# Patient Record
Sex: FEMALE | Race: OTHER | URBAN - METROPOLITAN AREA
[De-identification: names, ages, dates, MRNs, and addresses within clinical notes are randomized per-mention and may not be internally consistent; named-entity substitution may affect disease eponyms.]

---

## 2018-06-08 ENCOUNTER — INPATIENT (INPATIENT)
Facility: HOSPITAL | Age: 78
LOS: 3 days | Discharge: NURSING, REHAB OR LONG-TERM CARE | End: 2018-06-12
Attending: ORTHOPAEDIC SURGERY | Admitting: ORTHOPAEDIC SURGERY

## 2018-06-08 ENCOUNTER — TRANSCRIPTION ENCOUNTER (OUTPATIENT)
Age: 78
End: 2018-06-08

## 2018-06-08 VITALS
RESPIRATION RATE: 18 BRPM | TEMPERATURE: 98 F | OXYGEN SATURATION: 97 % | HEART RATE: 67 BPM | DIASTOLIC BLOOD PRESSURE: 77 MMHG | SYSTOLIC BLOOD PRESSURE: 184 MMHG

## 2018-06-08 LAB
ALBUMIN SERPL ELPH-MCNC: 3.7 G/DL — SIGNIFICANT CHANGE UP (ref 3.5–5.2)
ALP SERPL-CCNC: 108 U/L — SIGNIFICANT CHANGE UP (ref 30–115)
ALT FLD-CCNC: 20 U/L — SIGNIFICANT CHANGE UP (ref 0–41)
ANION GAP SERPL CALC-SCNC: 14 MMOL/L — SIGNIFICANT CHANGE UP (ref 7–14)
APTT BLD: 28.4 SEC — SIGNIFICANT CHANGE UP (ref 27–39.2)
AST SERPL-CCNC: 20 U/L — SIGNIFICANT CHANGE UP (ref 0–41)
BASOPHILS # BLD AUTO: 0.03 K/UL — SIGNIFICANT CHANGE UP (ref 0–0.2)
BASOPHILS NFR BLD AUTO: 0.4 % — SIGNIFICANT CHANGE UP (ref 0–1)
BILIRUB SERPL-MCNC: 0.6 MG/DL — SIGNIFICANT CHANGE UP (ref 0.2–1.2)
BUN SERPL-MCNC: 11 MG/DL — SIGNIFICANT CHANGE UP (ref 10–20)
CALCIUM SERPL-MCNC: 8.4 MG/DL — LOW (ref 8.5–10.1)
CHLORIDE SERPL-SCNC: 102 MMOL/L — SIGNIFICANT CHANGE UP (ref 98–110)
CO2 SERPL-SCNC: 24 MMOL/L — SIGNIFICANT CHANGE UP (ref 17–32)
CREAT SERPL-MCNC: <0.5 MG/DL — LOW (ref 0.7–1.5)
EOSINOPHIL # BLD AUTO: 0.02 K/UL — SIGNIFICANT CHANGE UP (ref 0–0.7)
EOSINOPHIL NFR BLD AUTO: 0.3 % — SIGNIFICANT CHANGE UP (ref 0–8)
GLUCOSE SERPL-MCNC: 138 MG/DL — HIGH (ref 70–99)
HCT VFR BLD CALC: 31.4 % — LOW (ref 37–47)
HGB BLD-MCNC: 10.5 G/DL — LOW (ref 12–16)
IMM GRANULOCYTES NFR BLD AUTO: 0.4 % — HIGH (ref 0.1–0.3)
INR BLD: 1.05 RATIO — SIGNIFICANT CHANGE UP (ref 0.65–1.3)
LYMPHOCYTES # BLD AUTO: 1.38 K/UL — SIGNIFICANT CHANGE UP (ref 1.2–3.4)
LYMPHOCYTES # BLD AUTO: 18.1 % — LOW (ref 20.5–51.1)
MCHC RBC-ENTMCNC: 29.5 PG — SIGNIFICANT CHANGE UP (ref 27–31)
MCHC RBC-ENTMCNC: 33.4 G/DL — SIGNIFICANT CHANGE UP (ref 32–37)
MCV RBC AUTO: 88.2 FL — SIGNIFICANT CHANGE UP (ref 81–99)
MONOCYTES # BLD AUTO: 0.51 K/UL — SIGNIFICANT CHANGE UP (ref 0.1–0.6)
MONOCYTES NFR BLD AUTO: 6.7 % — SIGNIFICANT CHANGE UP (ref 1.7–9.3)
NEUTROPHILS # BLD AUTO: 5.65 K/UL — SIGNIFICANT CHANGE UP (ref 1.4–6.5)
NEUTROPHILS NFR BLD AUTO: 74.1 % — SIGNIFICANT CHANGE UP (ref 42.2–75.2)
PLATELET # BLD AUTO: 197 K/UL — SIGNIFICANT CHANGE UP (ref 130–400)
POTASSIUM SERPL-MCNC: 3.9 MMOL/L — SIGNIFICANT CHANGE UP (ref 3.5–5)
POTASSIUM SERPL-SCNC: 3.9 MMOL/L — SIGNIFICANT CHANGE UP (ref 3.5–5)
PROT SERPL-MCNC: 6.5 G/DL — SIGNIFICANT CHANGE UP (ref 6–8)
PROTHROM AB SERPL-ACNC: 11.4 SEC — SIGNIFICANT CHANGE UP (ref 9.95–12.87)
RBC # BLD: 3.56 M/UL — LOW (ref 4.2–5.4)
RBC # FLD: 13.5 % — SIGNIFICANT CHANGE UP (ref 11.5–14.5)
SODIUM SERPL-SCNC: 140 MMOL/L — SIGNIFICANT CHANGE UP (ref 135–146)
TYPE + AB SCN PNL BLD: SIGNIFICANT CHANGE UP
WBC # BLD: 7.62 K/UL — SIGNIFICANT CHANGE UP (ref 4.8–10.8)
WBC # FLD AUTO: 7.62 K/UL — SIGNIFICANT CHANGE UP (ref 4.8–10.8)

## 2018-06-08 RX ORDER — MORPHINE SULFATE 50 MG/1
2 CAPSULE, EXTENDED RELEASE ORAL ONCE
Qty: 0 | Refills: 0 | Status: DISCONTINUED | OUTPATIENT
Start: 2018-06-08 | End: 2018-06-08

## 2018-06-08 RX ADMIN — MORPHINE SULFATE 2 MILLIGRAM(S): 50 CAPSULE, EXTENDED RELEASE ORAL at 23:06

## 2018-06-08 RX ADMIN — MORPHINE SULFATE 2 MILLIGRAM(S): 50 CAPSULE, EXTENDED RELEASE ORAL at 21:10

## 2018-06-08 NOTE — ED PROVIDER NOTE - PROGRESS NOTE DETAILS
spoke w/ ortho, resident requesting formal hip XR, CT hip, pre op labs, they will follow up. I personally evaluated the patient. I reviewed the Resident’s note (as assigned above), and agree with the findings and plan except as documented in my note.   77 y/o F Estonian speaking, presenting with R hip pain after falling yesterday. Pt was walking, tripped on a crack and fell on to her R side. Since fall pt could not stand and has been using her son to help her move from place to place. Pt was sent in from [facility] for fracture. Denies taking blood thinners. Denies head injury. Denies any other pain. On exam pt is elderly and thing, NAD, head NCAT, EOMI, PERRL, neck supple with no midline tenderness, no C/T/L/S spinal tenderness, lungs CTA, S1S2m abdomen soft NTND, (+) TTP to R hip, unable to rang due to pain, distal vascular pulses intact. A/P: Will get CT head, pain control, ortho eval and admit. I personally evaluated the patient. I reviewed the Resident’s note (as assigned above), and agree with the findings and plan except as documented in my note.   79 y/o F Yi speaking, presenting with R hip pain after falling yesterday. Pt was walking, tripped on a crack and fell on to her R side. Since fall pt could not stand and has been using her son to help her move from place to place. Pt was [place] [facility] [place]. Denies taking blood thinners. Denies head injury. Denies any other pain. On exam pt is elderly and thing, NAD, head NCAT, EOMI, PERRL, neck supple with no midline tenderness, no C/T/L/S spinal tenderness, lungs CTA, S1S2m abdomen soft NTND, (+) TTP to R hip, unable to rang due to pain, distal pulses intact, neurovascularly intact. A/P: Will get CT head, pain control, ortho eval and admit. inpt team will follow CMP

## 2018-06-08 NOTE — ED ADULT NURSE NOTE - OBJECTIVE STATEMENT
Pt presents with R hip pain after a fall yesterday. As per son, pt fell yesterday on pavement, tripped on a crack and fell onto her R side. Pt denies head trauma, denies LOC. Pt not on any blood thinners, no significant PMH. Pt denies chest pain, sob, n/v, chills, fever.

## 2018-06-08 NOTE — ED PROVIDER NOTE - PHYSICAL EXAMINATION
AOx4, Non toxic appearing, NAD, speaking in full sentences. Skin  warm and dry, no acute rash. Head normocephalic, atraumatic. PERRLA/EOMI, conjunctiva and sclera clear. MM moist, no nasal discharge.  Pharynx and TM's unremarkable.  No mastoid or temporal ttp. Neck supple nt, no meningeal signs. Heart RRR s1s2 nl, no rub/murmur. Lungs- No retractions, BS equal, CTAB. Abdomen soft ntnd no r/g. Extremities- R hip ttp over trochanter, RLE internally rotated shortened. +equal distal pulses, brisk cap refill, sensation wnl. No LE edema, calves nttp b/l.

## 2018-06-08 NOTE — ED ADULT TRIAGE NOTE - CHIEF COMPLAINT QUOTE
S/P trip and fall on uneven sidewalk, complaining of R hip pain. Pt denies head injury. Son states pt does not take any medications for any reason.

## 2018-06-08 NOTE — ED PROVIDER NOTE - NS ED ROS FT
Constitutional: See HPI.  Eyes: No visual changes, eye pain or discharge.  ENMT: No hearing changes, pain, discharge or infections. No neck pain or stiffness.  Cardiac: No chest pain, SOB or edema. No chest pain with exertion.  Respiratory: No cough or respiratory distress.   GI: No nausea, vomiting, diarrhea or abdominal pain.  : No dysuria, frequency or burning.  MS: +R hip pain  Neuro: No headache or weakness. No LOC.  Skin: No skin rash.

## 2018-06-08 NOTE — ED PROVIDER NOTE - OBJECTIVE STATEMENT
77 y/o F Saudi Arabian speaking no sig pmh per family p/f trip/fall onto R hip yesterday. Pt having pain but was able to bear weight w/ help. Denies striking head, LOC, neck pain.

## 2018-06-09 LAB
ABO RH CONFIRMATION: SIGNIFICANT CHANGE UP
ANION GAP SERPL CALC-SCNC: 12 MMOL/L — SIGNIFICANT CHANGE UP (ref 7–14)
ANION GAP SERPL CALC-SCNC: 14 MMOL/L — SIGNIFICANT CHANGE UP (ref 7–14)
APTT BLD: 27.5 SEC — SIGNIFICANT CHANGE UP (ref 27–39.2)
BASOPHILS # BLD AUTO: 0.03 K/UL — SIGNIFICANT CHANGE UP (ref 0–0.2)
BASOPHILS # BLD AUTO: 0.04 K/UL — SIGNIFICANT CHANGE UP (ref 0–0.2)
BASOPHILS NFR BLD AUTO: 0.4 % — SIGNIFICANT CHANGE UP (ref 0–1)
BASOPHILS NFR BLD AUTO: 0.7 % — SIGNIFICANT CHANGE UP (ref 0–1)
BUN SERPL-MCNC: 10 MG/DL — SIGNIFICANT CHANGE UP (ref 10–20)
BUN SERPL-MCNC: 12 MG/DL — SIGNIFICANT CHANGE UP (ref 10–20)
CALCIUM SERPL-MCNC: 8.1 MG/DL — LOW (ref 8.5–10.1)
CALCIUM SERPL-MCNC: 8.1 MG/DL — LOW (ref 8.5–10.1)
CHLORIDE SERPL-SCNC: 104 MMOL/L — SIGNIFICANT CHANGE UP (ref 98–110)
CHLORIDE SERPL-SCNC: 104 MMOL/L — SIGNIFICANT CHANGE UP (ref 98–110)
CO2 SERPL-SCNC: 23 MMOL/L — SIGNIFICANT CHANGE UP (ref 17–32)
CO2 SERPL-SCNC: 26 MMOL/L — SIGNIFICANT CHANGE UP (ref 17–32)
CREAT SERPL-MCNC: 0.5 MG/DL — LOW (ref 0.7–1.5)
CREAT SERPL-MCNC: 0.5 MG/DL — LOW (ref 0.7–1.5)
EOSINOPHIL # BLD AUTO: 0.05 K/UL — SIGNIFICANT CHANGE UP (ref 0–0.7)
EOSINOPHIL # BLD AUTO: 0.08 K/UL — SIGNIFICANT CHANGE UP (ref 0–0.7)
EOSINOPHIL NFR BLD AUTO: 0.7 % — SIGNIFICANT CHANGE UP (ref 0–8)
EOSINOPHIL NFR BLD AUTO: 1.4 % — SIGNIFICANT CHANGE UP (ref 0–8)
GLUCOSE SERPL-MCNC: 128 MG/DL — HIGH (ref 70–99)
GLUCOSE SERPL-MCNC: 129 MG/DL — HIGH (ref 70–99)
HCT VFR BLD CALC: 29.1 % — LOW (ref 37–47)
HCT VFR BLD CALC: 33.3 % — LOW (ref 37–47)
HGB BLD-MCNC: 11.1 G/DL — LOW (ref 12–16)
HGB BLD-MCNC: 9.9 G/DL — LOW (ref 12–16)
IMM GRANULOCYTES NFR BLD AUTO: 0.5 % — HIGH (ref 0.1–0.3)
IMM GRANULOCYTES NFR BLD AUTO: 0.9 % — HIGH (ref 0.1–0.3)
INR BLD: 1.05 RATIO — SIGNIFICANT CHANGE UP (ref 0.65–1.3)
LYMPHOCYTES # BLD AUTO: 1.16 K/UL — LOW (ref 1.2–3.4)
LYMPHOCYTES # BLD AUTO: 1.24 K/UL — SIGNIFICANT CHANGE UP (ref 1.2–3.4)
LYMPHOCYTES # BLD AUTO: 18.2 % — LOW (ref 20.5–51.1)
LYMPHOCYTES # BLD AUTO: 19.6 % — LOW (ref 20.5–51.1)
MCHC RBC-ENTMCNC: 29.4 PG — SIGNIFICANT CHANGE UP (ref 27–31)
MCHC RBC-ENTMCNC: 30.7 PG — SIGNIFICANT CHANGE UP (ref 27–31)
MCHC RBC-ENTMCNC: 33.3 G/DL — SIGNIFICANT CHANGE UP (ref 32–37)
MCHC RBC-ENTMCNC: 34 G/DL — SIGNIFICANT CHANGE UP (ref 32–37)
MCV RBC AUTO: 88.1 FL — SIGNIFICANT CHANGE UP (ref 81–99)
MCV RBC AUTO: 90.1 FL — SIGNIFICANT CHANGE UP (ref 81–99)
MONOCYTES # BLD AUTO: 0.43 K/UL — SIGNIFICANT CHANGE UP (ref 0.1–0.6)
MONOCYTES # BLD AUTO: 0.48 K/UL — SIGNIFICANT CHANGE UP (ref 0.1–0.6)
MONOCYTES NFR BLD AUTO: 6.3 % — SIGNIFICANT CHANGE UP (ref 1.7–9.3)
MONOCYTES NFR BLD AUTO: 8.1 % — SIGNIFICANT CHANGE UP (ref 1.7–9.3)
NEUTROPHILS # BLD AUTO: 4.12 K/UL — SIGNIFICANT CHANGE UP (ref 1.4–6.5)
NEUTROPHILS # BLD AUTO: 5.01 K/UL — SIGNIFICANT CHANGE UP (ref 1.4–6.5)
NEUTROPHILS NFR BLD AUTO: 69.7 % — SIGNIFICANT CHANGE UP (ref 42.2–75.2)
NEUTROPHILS NFR BLD AUTO: 73.5 % — SIGNIFICANT CHANGE UP (ref 42.2–75.2)
NRBC # BLD: 0 /100 WBCS — SIGNIFICANT CHANGE UP (ref 0–0)
PLATELET # BLD AUTO: 175 K/UL — SIGNIFICANT CHANGE UP (ref 130–400)
PLATELET # BLD AUTO: 177 K/UL — SIGNIFICANT CHANGE UP (ref 130–400)
POTASSIUM SERPL-MCNC: 3.7 MMOL/L — SIGNIFICANT CHANGE UP (ref 3.5–5)
POTASSIUM SERPL-MCNC: 4.1 MMOL/L — SIGNIFICANT CHANGE UP (ref 3.5–5)
POTASSIUM SERPL-SCNC: 3.7 MMOL/L — SIGNIFICANT CHANGE UP (ref 3.5–5)
POTASSIUM SERPL-SCNC: 4.1 MMOL/L — SIGNIFICANT CHANGE UP (ref 3.5–5)
PROTHROM AB SERPL-ACNC: 11.3 SEC — SIGNIFICANT CHANGE UP (ref 9.95–12.87)
RBC # BLD: 3.23 M/UL — LOW (ref 4.2–5.4)
RBC # BLD: 3.78 M/UL — LOW (ref 4.2–5.4)
RBC # FLD: 13.5 % — SIGNIFICANT CHANGE UP (ref 11.5–14.5)
RBC # FLD: 13.8 % — SIGNIFICANT CHANGE UP (ref 11.5–14.5)
SODIUM SERPL-SCNC: 141 MMOL/L — SIGNIFICANT CHANGE UP (ref 135–146)
SODIUM SERPL-SCNC: 142 MMOL/L — SIGNIFICANT CHANGE UP (ref 135–146)
TYPE + AB SCN PNL BLD: SIGNIFICANT CHANGE UP
WBC # BLD: 5.91 K/UL — SIGNIFICANT CHANGE UP (ref 4.8–10.8)
WBC # BLD: 6.82 K/UL — SIGNIFICANT CHANGE UP (ref 4.8–10.8)
WBC # FLD AUTO: 5.91 K/UL — SIGNIFICANT CHANGE UP (ref 4.8–10.8)
WBC # FLD AUTO: 6.82 K/UL — SIGNIFICANT CHANGE UP (ref 4.8–10.8)

## 2018-06-09 RX ORDER — HYDROMORPHONE HYDROCHLORIDE 2 MG/ML
1 INJECTION INTRAMUSCULAR; INTRAVENOUS; SUBCUTANEOUS
Qty: 0 | Refills: 0 | Status: DISCONTINUED | OUTPATIENT
Start: 2018-06-09 | End: 2018-06-09

## 2018-06-09 RX ORDER — ONDANSETRON 8 MG/1
4 TABLET, FILM COATED ORAL ONCE
Qty: 0 | Refills: 0 | Status: DISCONTINUED | OUTPATIENT
Start: 2018-06-09 | End: 2018-06-09

## 2018-06-09 RX ORDER — ENOXAPARIN SODIUM 100 MG/ML
40 INJECTION SUBCUTANEOUS DAILY
Qty: 0 | Refills: 0 | Status: DISCONTINUED | OUTPATIENT
Start: 2018-06-09 | End: 2018-06-09

## 2018-06-09 RX ORDER — DOCUSATE SODIUM 100 MG
100 CAPSULE ORAL THREE TIMES A DAY
Qty: 0 | Refills: 0 | Status: DISCONTINUED | OUTPATIENT
Start: 2018-06-09 | End: 2018-06-10

## 2018-06-09 RX ORDER — ENOXAPARIN SODIUM 100 MG/ML
40 INJECTION SUBCUTANEOUS DAILY
Qty: 0 | Refills: 0 | Status: DISCONTINUED | OUTPATIENT
Start: 2018-06-09 | End: 2018-06-12

## 2018-06-09 RX ORDER — MEPERIDINE HYDROCHLORIDE 50 MG/ML
12.5 INJECTION INTRAMUSCULAR; INTRAVENOUS; SUBCUTANEOUS
Qty: 0 | Refills: 0 | Status: DISCONTINUED | OUTPATIENT
Start: 2018-06-09 | End: 2018-06-09

## 2018-06-09 RX ORDER — SODIUM CHLORIDE 9 MG/ML
1000 INJECTION, SOLUTION INTRAVENOUS
Qty: 0 | Refills: 0 | Status: DISCONTINUED | OUTPATIENT
Start: 2018-06-09 | End: 2018-06-09

## 2018-06-09 RX ORDER — MORPHINE SULFATE 50 MG/1
2 CAPSULE, EXTENDED RELEASE ORAL EVERY 6 HOURS
Qty: 0 | Refills: 0 | Status: DISCONTINUED | OUTPATIENT
Start: 2018-06-09 | End: 2018-06-09

## 2018-06-09 RX ORDER — CEFAZOLIN SODIUM 1 G
1000 VIAL (EA) INJECTION EVERY 8 HOURS
Qty: 0 | Refills: 0 | Status: COMPLETED | OUTPATIENT
Start: 2018-06-09 | End: 2018-06-10

## 2018-06-09 RX ORDER — HYDROMORPHONE HYDROCHLORIDE 2 MG/ML
0.5 INJECTION INTRAMUSCULAR; INTRAVENOUS; SUBCUTANEOUS
Qty: 0 | Refills: 0 | Status: DISCONTINUED | OUTPATIENT
Start: 2018-06-09 | End: 2018-06-09

## 2018-06-09 RX ORDER — MORPHINE SULFATE 50 MG/1
2 CAPSULE, EXTENDED RELEASE ORAL EVERY 6 HOURS
Qty: 0 | Refills: 0 | Status: DISCONTINUED | OUTPATIENT
Start: 2018-06-09 | End: 2018-06-12

## 2018-06-09 RX ADMIN — MORPHINE SULFATE 2 MILLIGRAM(S): 50 CAPSULE, EXTENDED RELEASE ORAL at 16:15

## 2018-06-09 RX ADMIN — Medication 100 MILLIGRAM(S): at 19:08

## 2018-06-09 RX ADMIN — MORPHINE SULFATE 2 MILLIGRAM(S): 50 CAPSULE, EXTENDED RELEASE ORAL at 10:13

## 2018-06-09 RX ADMIN — HYDROMORPHONE HYDROCHLORIDE 0.5 MILLIGRAM(S): 2 INJECTION INTRAMUSCULAR; INTRAVENOUS; SUBCUTANEOUS at 14:17

## 2018-06-09 RX ADMIN — Medication 100 MILLIGRAM(S): at 21:42

## 2018-06-09 RX ADMIN — HYDROMORPHONE HYDROCHLORIDE 0.5 MILLIGRAM(S): 2 INJECTION INTRAMUSCULAR; INTRAVENOUS; SUBCUTANEOUS at 14:02

## 2018-06-09 RX ADMIN — SODIUM CHLORIDE 75 MILLILITER(S): 9 INJECTION, SOLUTION INTRAVENOUS at 13:48

## 2018-06-09 NOTE — CONSULT NOTE ADULT - SUBJECTIVE AND OBJECTIVE BOX
Orthopedics Consult Note:    This is a 78y Female who presents to the ED today with c/o right hip pain and inability to bear weight s/p fall yesterday. Per her son she tripped on the sidewalk and was in severe pain and unable to walk; when pain did not improve today he decided to bring her in for evaluation.  Pt denies any other injuries. Pt denies head trauma or LOC. Reports trip was mechanical and not syncopal. Pt denies any numbness, tingling or parethesias. Pt denies fever or chills.    Past Medical & Surgical History:  osteoporosis (per son she used to take an oral medication to treat this but he was not sure what; no longer takes)    Home Medications:   none    Allergies:  No Known Allergies    Social history: ambulates w/out assistive device at baseline; [place] from out of country currently staying with her son; Khmer speaking    Physical Exam:  Vital Signs:  T(C): 37.2 (06-08-18 @ 23:35), Max: 37.4 (06-08-18 @ 22:33)  HR: 65 (06-08-18 @ 23:35) (65 - 67)  BP: 151/72 (06-08-18 @ 23:35) (151/72 - 184/77)  RR: 18 (06-08-18 @ 23:35) (18 - 18)  SpO2: 97% (06-08-18 @ 23:35) (93% - 97%)    AAOx3  NAD  unlabored breathing RA  PE RLE  no gross deformity  skin intact  ttp over right hip/groin  nontender distal femur/knee/tibfib/ankle/foot  small abrasion over right knee  thigh/calf soft  motor intact ehl/fhl/ta/gs  silt sp/dp/t  wwp    Secondary Survey:  b/l UE pain free able to move, skin intact no deformities, NVID  LLE no deformity skin intact, nontender hip/femur/knee/tibfib/ankle/foot, NVID     Labs:     CBC:                     10.5   7.62  )-----------( 197      ( 08 Jun 2018 21:32 )             31.4     Chem 06-08:  140  |  102  |  11  ----------------------------<  138<H>  3.9   |  24  |  <0.5<L>  Ca    8.4<L>      08 Jun 2018 21:32  TPro  6.5  /  Alb  3.7  /  TBili  0.6  /  DBili  x   /  AST  20  /  ALT  20  /  AlkPhos  108  06-08    Coags:  PT/INR - ( 08 Jun 2018 21:32 )   PT: 11.40 sec;   INR: 1.05 ratio    PTT - ( 08 Jun 2018 21:32 )  PTT:28.4 sec    X-rays of the pelvis, right hip, femur, and knee demonstrate a nondisplaced intertrochanteric hip fracture; confirmed on CT. No other obvious fx/injury    A/P: 78y Female with a right nondisplaced intertrochanteric hip fracture s/p fall yesterday.  - Pt and family advised that surgical fixation of right hip fracture with open reduction internal fixation is necessary to allow early mobilization and prevent the complications associated with prolonged bed rest.  - Surgical procedure discussed in detail with pt and family including all R/B/As; Pt and patient's family expressed understanding   -Admit to Medical service for optimization and medical clearance.  - f/u medical clearance.  - f/u labs/imaging; please repeat type and screen and CBC; 2U PRBCs ordered on hold for OR  - Complete bedrest.  - Pain control.  - NPO for possible OR later today pending medical optimization and clearance.   - DVT ppx with SCDs or foot pumps AND with heparin subq or lovenox per medical team; please DO NOT hold chemical dvt prophylaxis Orthopedics Consult Note:    This is a 78y Female who presents to the ED today with c/o right hip pain and inability to bear weight s/p fall yesterday. Per her son she tripped on the sidewalk and was in severe pain and unable to walk; when pain did not improve today he decided to bring her in for evaluation.  Pt denies any other injuries. Denies antecedent pain. 	Pt denies head trauma or LOC. Reports trip was mechanical and not syncopal. Pt denies any numbness, tingling or parethesias. Pt denies fever or chills.    Past Medical & Surgical History:  osteoporosis (per son she used to take an oral medication to treat this but he was not sure what; no longer takes)    Home Medications:   none    Allergies:  No Known Allergies    Social history: ambulates w/out assistive device at baseline; visiting from [place] of [place] currently staying with her son; Colombian speaking    Physical Exam:  Vital Signs:  T(C): 37.2 (06-08-18 @ 23:35), Max: 37.4 (06-08-18 @ 22:33)  HR: 65 (06-08-18 @ 23:35) (65 - 67)  BP: 151/72 (06-08-18 @ 23:35) (151/72 - 184/77)  RR: 18 (06-08-18 @ 23:35) (18 - 18)  SpO2: 97% (06-08-18 @ 23:35) (93% - 97%)    AAOx3  NAD  unlabored breathing RA  PE RLE  no gross deformity  skin intact  ttp over right hip/groin  nontender distal femur/knee/tibfib/ankle/foot  small abrasion over right knee  thigh/calf soft  motor intact ehl/fhl/ta/gs  silt sp/dp/t  wwp    Secondary Survey:  b/l UE pain free able to move, skin intact no deformities, NVID  LLE no deformity skin intact, nontender hip/femur/knee/tibfib/ankle/foot, NVID     Labs:     CBC:                     10.5   7.62  )-----------( 197      ( 08 Jun 2018 21:32 )             31.4     Chem 06-08:  140  |  102  |  11  ----------------------------<  138<H>  3.9   |  24  |  <0.5<L>  Ca    8.4<L>      08 Jun 2018 21:32  TPro  6.5  /  Alb  3.7  /  TBili  0.6  /  DBili  x   /  AST  20  /  ALT  20  /  AlkPhos  108  06-08    Coags:  PT/INR - ( 08 Jun 2018 21:32 )   PT: 11.40 sec;   INR: 1.05 ratio    PTT - ( 08 Jun 2018 21:32 )  PTT:28.4 sec    X-rays of the pelvis, right hip, femur, and knee demonstrate a nondisplaced intertrochanteric hip fracture; confirmed on CT. No other obvious fx/injury    A/P: 78y Female with a right nondisplaced intertrochanteric hip fracture s/p fall yesterday.  - Pt and family advised that surgical fixation of right hip fracture with open reduction internal fixation is necessary to allow early mobilization and prevent the complications associated with prolonged bed rest.  - Surgical procedure discussed in detail with pt and family including all R/B/As; Pt and patient's family expressed understanding   -Admit to Medical service for optimization and medical clearance.  - f/u medical clearance.  - f/u labs/imaging; please repeat type and screen and CBC; 2U PRBCs ordered on hold for OR  - Complete bedrest.  - Pain control.  - NPO for possible OR later today pending medical optimization and clearance.   - DVT ppx with SCDs or foot pumps AND with heparin subq or lovenox per medical team; please DO NOT hold chemical dvt prophylaxis

## 2018-06-09 NOTE — H&P ADULT - NSHPLABSRESULTS_GEN_ALL_CORE
Medications:    MEDICATIONS  (STANDING):    MEDICATIONS  (PRN):      Vitals:    Vital Signs Last 24 Hrs  T(C): 37.3 (09 Jun 2018 01:15), Max: 37.4 (08 Jun 2018 22:33)  T(F): 99.2 (09 Jun 2018 01:15), Max: 99.4 (08 Jun 2018 22:33)  HR: 66 (09 Jun 2018 01:15) (65 - 67)  BP: 147/65 (09 Jun 2018 01:15) (147/65 - 184/77)  BP(mean): --  RR: 18 (09 Jun 2018 01:15) (18 - 18)  SpO2: 97% (08 Jun 2018 23:35) (93% - 97%)    Labs:     06-08    140  |  102  |  11  ----------------------------<  138<H>  3.9   |  24  |  <0.5<L>    Ca    8.4<L>      08 Jun 2018 21:32    TPro  6.5  /  Alb  3.7  /  TBili  0.6  /  DBili  x   /  AST  20  /  ALT  20  /  AlkPhos  108  06-08                          10.5   7.62  )-----------( 197      ( 08 Jun 2018 21:32 )             31.4         LIVER FUNCTIONS - ( 08 Jun 2018 21:32 )  Alb: 3.7 g/dL / Pro: 6.5 g/dL / ALK PHOS: 108 U/L / ALT: 20 U/L / AST: 20 U/L / GGT: x           PT/INR - ( 08 Jun 2018 21:32 )   PT: 11.40 sec;   INR: 1.05 ratio         PTT - ( 08 Jun 2018 21:32 )  PTT:28.4 sec      Cultures:

## 2018-06-09 NOTE — BRIEF OPERATIVE NOTE - PROCEDURE
<<-----Click on this checkbox to enter Procedure Fixation, fracture, intertrochanteric, with intramedullary angelita  06/09/2018  right  Active  [email or web address]

## 2018-06-09 NOTE — H&P ADULT - ATTENDING COMMENTS
Patient seen and examined independently. I agree with the resident's note, physical exam, and plan except as below.  discussed with ortho team, pt, and family at bedside - pt visiting from [place]   #Right IT hip fracture - mechanical fall  - planned for OR today  -pain control, dvt proph  -pt is low risk for mod risk procedure  -ekg wnl, no cp, sob, mets>4 fully functional   may proceed to OR without further workup  pt/rehab post op

## 2018-06-09 NOTE — CONSULT NOTE ADULT - ATTENDING COMMENTS
Pt seen and examined.  Agree with resident exam and plan.    R intertrochanteric hip fracture.    NWB RLE, bedrest, pain control, NPO after MN, clearance for OR for IMN.

## 2018-06-09 NOTE — H&P ADULT - NSHPREVIEWOFSYSTEMS_GEN_ALL_CORE
Constitutional: See HPI  Eyes: No visual changes, eye pain or discharge  ENMT: No hearing changes, pain, discharge or infections. No neck pain or stiffness  Cardiac: No chest pain, SOB or edema. No chest pain with exertion  Respiratory: No cough or respiratory distress  GI: No nausea, vomiting, diarrhea or abdominal pain  : No dysuria, frequency or burning  MS: +R hip pain  Neuro: No headache or weakness. No LOC  Skin: No skin rash

## 2018-06-09 NOTE — H&P ADULT - NSHPPHYSICALEXAM_GEN_ALL_CORE
General: NAD, Non toxic appearing, speaking in full sentences.   Skin:  warm and dry, no acute rash.   Head: normocephalic, atraumatic  PERRLA/EOMI: conjunctiva and sclera clear  Cardio: RRR s1s2 nl, no rub/murmur.   Pulm: CTAB, no wheezing, no crackles.   Abdomen: BS+, nontender to palpation on all quadrants, no distention  Extremities- small abrasion over right knee, tenderness to palpation over right trochanter, +equal distal pulses, brisk cap refill, sensation wnl. No LE edema  Neuro: AOx3

## 2018-06-09 NOTE — PRE-ANESTHESIA EVALUATION ADULT - NSANTHOSAYNRD_GEN_A_CORE
No. RL screening performed.  STOP BANG Legend: 0-2 = LOW Risk; 3-4 = INTERMEDIATE Risk; 5-8 = HIGH Risk

## 2018-06-09 NOTE — PATIENT PROFILE ADULT. - LANGUAGE ASSISTANCE NEEDED
No-Patient/Caregiver offered and refused free interpretation services./family at bedside for translation

## 2018-06-09 NOTE — H&P ADULT - HISTORY OF PRESENT ILLNESS
78y F with no significant pmh presenting to ED with right hip pain and inability to bear weight s/p fall yesterday. She tripped and fell on the sidewalk and was in severe pain and unable to walk. Pain has persisted and localized to her right hip. no exacerbating or alleviating factors. No other injuries, no LOC. No dizziness/nausea before fall. Denies any numbness, paresthesias, fever, chills, SOB.

## 2018-06-09 NOTE — BRIEF OPERATIVE NOTE - POST-OP DX
Closed displaced intertrochanteric fracture of right femur, initial encounter  06/09/2018    Active  Marissa Gonzalez

## 2018-06-09 NOTE — H&P ADULT - ASSESSMENT
77 yo F with no pmh presenting with right intertrochanteric hip fracture yesterday.    # Right intertrochanteric hip fracture:  X-rays of the pelvis, right hip, femur, and knee showing nondisplaced intertrochanteric hip fracture; confirmed on CT.  - Ortho on board and suggests surgical procedure: IMN, Surgical procedure discussed in detail with pt and family including all R/B/As; pt in agreement  - Requires medical clearance  - 2U PRBCs ordered on hold for OR  - c/w IV morphine 2mg q6 prn for pain control  - NPO after midnight for possible OR on 6/9      Activity: complete bedrest  DVT ppx:  SCDs and lovenox subc (both per ortho request)    DISPO: home, full code, consider PT rehab s/p IMN

## 2018-06-09 NOTE — BRIEF OPERATIVE NOTE - PRE-OP DX
Intertrochanteric fracture of right femur, closed, initial encounter  06/09/2018    Active  Marissa Gonzalez

## 2018-06-10 LAB
ANION GAP SERPL CALC-SCNC: 15 MMOL/L — HIGH (ref 7–14)
BASOPHILS # BLD AUTO: 0.03 K/UL — SIGNIFICANT CHANGE UP (ref 0–0.2)
BASOPHILS NFR BLD AUTO: 0.5 % — SIGNIFICANT CHANGE UP (ref 0–1)
BUN SERPL-MCNC: 9 MG/DL — LOW (ref 10–20)
CALCIUM SERPL-MCNC: 7.9 MG/DL — LOW (ref 8.5–10.1)
CHLORIDE SERPL-SCNC: 97 MMOL/L — LOW (ref 98–110)
CO2 SERPL-SCNC: 26 MMOL/L — SIGNIFICANT CHANGE UP (ref 17–32)
CREAT SERPL-MCNC: 0.6 MG/DL — LOW (ref 0.7–1.5)
EOSINOPHIL # BLD AUTO: 0.02 K/UL — SIGNIFICANT CHANGE UP (ref 0–0.7)
EOSINOPHIL NFR BLD AUTO: 0.3 % — SIGNIFICANT CHANGE UP (ref 0–8)
GLUCOSE SERPL-MCNC: 134 MG/DL — HIGH (ref 70–99)
HCT VFR BLD CALC: 26.5 % — LOW (ref 37–47)
HGB BLD-MCNC: 9.7 G/DL — LOW (ref 12–16)
IMM GRANULOCYTES NFR BLD AUTO: 0.6 % — HIGH (ref 0.1–0.3)
LYMPHOCYTES # BLD AUTO: 1.01 K/UL — LOW (ref 1.2–3.4)
LYMPHOCYTES # BLD AUTO: 15.9 % — LOW (ref 20.5–51.1)
MCHC RBC-ENTMCNC: 32.8 PG — HIGH (ref 27–31)
MCHC RBC-ENTMCNC: 36.6 G/DL — SIGNIFICANT CHANGE UP (ref 32–37)
MCV RBC AUTO: 89.5 FL — SIGNIFICANT CHANGE UP (ref 81–99)
MONOCYTES # BLD AUTO: 0.61 K/UL — HIGH (ref 0.1–0.6)
MONOCYTES NFR BLD AUTO: 9.6 % — HIGH (ref 1.7–9.3)
NEUTROPHILS # BLD AUTO: 4.64 K/UL — SIGNIFICANT CHANGE UP (ref 1.4–6.5)
NEUTROPHILS NFR BLD AUTO: 73.1 % — SIGNIFICANT CHANGE UP (ref 42.2–75.2)
PLATELET # BLD AUTO: 183 K/UL — SIGNIFICANT CHANGE UP (ref 130–400)
POTASSIUM SERPL-MCNC: 3.9 MMOL/L — SIGNIFICANT CHANGE UP (ref 3.5–5)
POTASSIUM SERPL-SCNC: 3.9 MMOL/L — SIGNIFICANT CHANGE UP (ref 3.5–5)
RBC # BLD: 2.96 M/UL — LOW (ref 4.2–5.4)
RBC # FLD: 13.6 % — SIGNIFICANT CHANGE UP (ref 11.5–14.5)
SODIUM SERPL-SCNC: 138 MMOL/L — SIGNIFICANT CHANGE UP (ref 135–146)
WBC # BLD: 6.35 K/UL — SIGNIFICANT CHANGE UP (ref 4.8–10.8)
WBC # FLD AUTO: 6.35 K/UL — SIGNIFICANT CHANGE UP (ref 4.8–10.8)

## 2018-06-10 RX ORDER — ACETAMINOPHEN 500 MG
650 TABLET ORAL EVERY 6 HOURS
Qty: 0 | Refills: 0 | Status: DISCONTINUED | OUTPATIENT
Start: 2018-06-10 | End: 2018-06-12

## 2018-06-10 RX ADMIN — Medication 650 MILLIGRAM(S): at 08:32

## 2018-06-10 RX ADMIN — ENOXAPARIN SODIUM 40 MILLIGRAM(S): 100 INJECTION SUBCUTANEOUS at 11:45

## 2018-06-10 RX ADMIN — Medication 100 MILLIGRAM(S): at 05:42

## 2018-06-10 RX ADMIN — Medication 100 MILLIGRAM(S): at 03:32

## 2018-06-11 LAB
APPEARANCE UR: (no result)
BILIRUB UR-MCNC: NEGATIVE — SIGNIFICANT CHANGE UP
C DIFF BY PCR RESULT: NEGATIVE — SIGNIFICANT CHANGE UP
C DIFF TOX GENS STL QL NAA+PROBE: SIGNIFICANT CHANGE UP
COLOR SPEC: YELLOW — SIGNIFICANT CHANGE UP
DIFF PNL FLD: NEGATIVE — SIGNIFICANT CHANGE UP
GLUCOSE UR QL: NEGATIVE MG/DL — SIGNIFICANT CHANGE UP
KETONES UR-MCNC: NEGATIVE — SIGNIFICANT CHANGE UP
LEUKOCYTE ESTERASE UR-ACNC: NEGATIVE — SIGNIFICANT CHANGE UP
NITRITE UR-MCNC: NEGATIVE — SIGNIFICANT CHANGE UP
PH UR: 6.5 — SIGNIFICANT CHANGE UP (ref 5–8)
PROT UR-MCNC: 30 MG/DL
SP GR SPEC: 1.01 — SIGNIFICANT CHANGE UP (ref 1.01–1.03)
UROBILINOGEN FLD QL: 4 MG/DL (ref 0.2–0.2)

## 2018-06-11 RX ADMIN — Medication 650 MILLIGRAM(S): at 00:22

## 2018-06-11 RX ADMIN — ENOXAPARIN SODIUM 40 MILLIGRAM(S): 100 INJECTION SUBCUTANEOUS at 12:43

## 2018-06-11 NOTE — CONSULT NOTE ADULT - ASSESSMENT
IMPRESSION: Rehab of Right hip IT fx / s/p orif    PRECAUTIONS: [  ] Cardiac  [  ] Respiratory  [  ] Seizures [  ] Contact Isolation  [  ] Droplet Isolation  [  ] Other    Weight Bearing Status: wbat    RECOMMENDATION:    Out of Bed to Chair     DVT/Decubiti Prophylaxis    REHAB PLAN:     [ x  ] Bedside P/T 3-5 times a week   [   ]   Bedside O/T  2-3 times a week             [   ] No Rehab Therapy Indicated                   [   ]  Speech Therapy   Conditioning/ROM                                    ADL  Bed Mobility                                               Conditioning/ROM  Transfers                                                     Bed Mobility  Sitting /Standing Balance                         Transfers                                        Gait Training                                               Sitting/Standing Balance  Stair Training [   ]Applicable                    Home equipment Eval                                                                        Splinting  [   ] Only      GOALS:   ADL   [ x  ]   Independent                    Transfers  [ x  ] Independent                          Ambulation  [ x  ] Independent     [  x  ] With device                            [   ]  CG                                                         [   ]  CG                                                                  [   ] CG                            [    ] Min A                                                   [   ] Min A                                                              [   ] Min  A          DISCHARGE PLAN:   [ x  ]  Good candidate for Intensive Rehabilitation/Hospital based-[facility]A [facility]                                             Will tolerate 3hrs Intensive Rehab Daily                                       [    ]  Short Term Rehab in Skilled Nursing Facility                                       [    ]  Home with Outpatient or  services                                         [    ]  Possible Candidate for Intensive Hospital based Rehab IMPRESSION: Rehab of Right hip IT fx / s/p orif    PRECAUTIONS: [  ] Cardiac  [  ] Respiratory  [  ] Seizures [  ] Contact Isolation  [  ] Droplet Isolation  [  ] Other    Weight Bearing Status: wbat    RECOMMENDATION:    Out of Bed to Chair     DVT/Decubiti Prophylaxis    REHAB PLAN:     [ x  ] Bedside P/T 3-5 times a week   [x   ]   Bedside O/T  2-3 times a week             [   ] No Rehab Therapy Indicated                   [   ]  Speech Therapy   Conditioning/ROM                                    ADL  Bed Mobility                                               Conditioning/ROM  Transfers                                                     Bed Mobility  Sitting /Standing Balance                         Transfers                                        Gait Training                                               Sitting/Standing Balance  Stair Training [   ]Applicable                    Home equipment Eval                                                                        Splinting  [   ] Only      GOALS:   ADL   [ x  ]   Independent                    Transfers  [ x  ] Independent                          Ambulation  [ x  ] Independent     [  x  ] With device                            [   ]  CG                                                         [   ]  CG                                                                  [   ] CG                            [    ] Min A                                                   [   ] Min A                                                              [   ] Min  A          DISCHARGE PLAN:   [ x  ]  Good candidate for Intensive Rehabilitation/Hospital based-[facility]A [facility]                                             Will tolerate 3hrs Intensive Rehab Daily                                       [    ]  Short Term Rehab in Skilled Nursing Facility                                       [    ]  Home with Outpatient or VN services                                         [    ]  Possible Candidate for Intensive Hospital based Rehab

## 2018-06-11 NOTE — CONSULT NOTE ADULT - SUBJECTIVE AND OBJECTIVE BOX
HPI:  78y F with no significant pmh presenting to ED with right hip pain and inability to bear weight s/p fall yesterday. She tripped and fell on the sidewalk and was in severe pain and unable to walk. Pain has persisted and localized to her right hip. no exacerbating or alleviating factors. No other injuries, no LOC. No dizziness/nausea before fall. Denies any numbness, paresthesias, fever, chills, SOB. (2018 01:46). S/P orif right hip IT fx, wbat per ortho.      PAST MEDICAL & SURGICAL HISTORY:  No pertinent past medical history  No significant past surgical history      Hospital Course:    TODAY'S SUBJECTIVE & REVIEW OF SYMPTOMS:     Constitutional WNL   Cardio WNL   Resp WNL   GI WNL  Heme WNL  Endo WNL  Skin WNL  MSK right hip pain  Neuro WNL  Cognitive WNL  Psych WNL      MEDICATIONS  (STANDING):  enoxaparin Injectable 40 milliGRAM(s) SubCutaneous daily    MEDICATIONS  (PRN):  acetaminophen    Suspension 650 milliGRAM(s) Oral every 6 hours PRN For Temp greater than 38.5 C (101.3 F)  morphine  - Injectable 2 milliGRAM(s) IV Push every 6 hours PRN Severe Pain (7 - 10)      FAMILY HISTORY:      Allergies    No Known Allergies    Intolerances        SOCIAL HISTORY:    [  ] Etoh  [  ] Smoking  [  ] Substance abuse     Home Environment:  [  ] Home Alone  [ x ] Lives with Family  [  ] Home Health Aid    Dwelling:  [  ] Apartment  [ x ] Private House  [  ] Adult Home  [  ] Skilled Nursing Facility      [  ] Short Term  [  ] Long Term  [  ] Stairs       Elevator [  ]    FUNCTIONAL STATUS PTA: (Check all that apply)  Ambulation: [ x  ]Independent    [  ] Dependent     [  ] Non-Ambulatory  Assistive Device: [  ] SA Cane  [  ]  Q Cane  [  ] Walker  [  ]  Wheelchair  ADL : [ x ] Independent  [  ]  Dependent       Vital Signs Last 24 Hrs  T(C): 36.9 (2018 08:00), Max: 38.1 (2018 00:00)  T(F): 98.4 (2018 08:00), Max: 100.5 (2018 00:00)  HR: 68 (2018 08:00) (68 - 75)  BP: 149/63 (2018 08:00) (119/56 - 149/63)  BP(mean): --  RR: 18 (2018 08:00) (16 - 18)  SpO2: --      PHYSICAL EXAM: Alert & Oriented X3  GENERAL: NAD, well-groomed, well-developed  HEAD:  Atraumatic, Normocephalic  CHEST/LUNG: Clear   HEART: S1S2+  ABDOMEN: Soft, Nontender  EXTREMITIES:  no calf tenderness    NERVOUS SYSTEM:  Cranial Nerves 2-12 intact [  ] Abnormal  [  ]  ROM: WFL all extremities [  ]  Abnormal [x  ]limited rle  Motor Strength: WFL all extremities  [  ]  Abnormal [x  ]limited rle  Sensation: intact to light touch [x  ] Abnormal [  ]  Reflexes: Symmetric [  ]  Abnormal [  ]    FUNCTIONAL STATUS:  Bed Mobility: Independent [  ]  Supervision [  ]  Needs Assistance [x  ]  N/A [  ]  Transfers: Independent [  ]  Supervision [  ]  Needs Assistance [x  ]  N/A [  ]   Ambulation: Independent [  ]  Supervision [  ]  Needs Assistance [  ]  N/A [  ]  ADL: Independent [  ] Requires Assistance [  ] N/A [  ]      LABS:                        9.7    6.35  )-----------( 183      ( 10 Abner 2018 07:38 )             26.5     06-10    138  |  97<L>  |  9<L>  ----------------------------<  134<H>  3.9   |  26  |  0.6<L>    Ca    7.9<L>      10 Abner 2018 07:38        Urinalysis Basic - ( 10 Abner 2018 22:39 )    Color: Yellow / Appearance: Cloudy / S.015 / pH: x  Gluc: x / Ketone: Negative  / Bili: Negative / Urobili: 4.0 mg/dL   Blood: x / Protein: 30 mg/dL / Nitrite: Negative   Leuk Esterase: Negative / RBC: x / WBC x   Sq Epi: x / Non Sq Epi: Occasional /HPF / Bacteria: Few /HPF        RADIOLOGY & ADDITIONAL STUDIES:    Assesment:

## 2018-06-11 NOTE — OCCUPATIONAL THERAPY INITIAL EVALUATION ADULT - GENERAL OBSERVATIONS, REHAB EVAL
pt received seated in b/s chair in NAD, + IV lock, agreeable to OT eval, left seated in b/s chair, pt's grandson and granddaughter present throughout OT eval and translated for pt at pt request

## 2018-06-11 NOTE — PHYSICAL THERAPY INITIAL EVALUATION ADULT - GENERAL OBSERVATIONS, REHAB EVAL
11:25-12:00; Pt encountered in bed, agreeable to PT. Granddaughters present, assisted with translation.

## 2018-06-11 NOTE — OCCUPATIONAL THERAPY INITIAL EVALUATION ADULT - ADDITIONAL COMMENTS
pt was visiting family in [place] from [place].  Per report, pt was independent prior to admit.  Family states she will stay with a family member where she can stay on one level post d/c

## 2018-06-11 NOTE — OCCUPATIONAL THERAPY INITIAL EVALUATION ADULT - PLANNED THERAPY INTERVENTIONS, OT EVAL
parent/caregiver training.../ADL retraining/balance training/strengthening/ROM/transfer training/bed mobility training

## 2018-06-12 ENCOUNTER — TRANSCRIPTION ENCOUNTER (OUTPATIENT)
Age: 78
End: 2018-06-12

## 2018-06-12 ENCOUNTER — INPATIENT (INPATIENT)
Facility: HOSPITAL | Age: 78
LOS: 8 days | Discharge: HOME | End: 2018-06-21
Attending: PHYSICAL MEDICINE & REHABILITATION | Admitting: PHYSICAL MEDICINE & REHABILITATION
Payer: MEDICAID

## 2018-06-12 VITALS
TEMPERATURE: 99 F | RESPIRATION RATE: 18 BRPM | HEART RATE: 72 BPM | SYSTOLIC BLOOD PRESSURE: 132 MMHG | DIASTOLIC BLOOD PRESSURE: 60 MMHG

## 2018-06-12 LAB
ALBUMIN SERPL ELPH-MCNC: 3.1 G/DL — LOW (ref 3.5–5.2)
ALBUMIN SERPL ELPH-MCNC: 3.1 G/DL — LOW (ref 3.5–5.2)
ALP SERPL-CCNC: 84 U/L — SIGNIFICANT CHANGE UP (ref 30–115)
ALP SERPL-CCNC: 87 U/L — SIGNIFICANT CHANGE UP (ref 30–115)
ALT FLD-CCNC: 20 U/L — SIGNIFICANT CHANGE UP (ref 0–41)
ALT FLD-CCNC: 20 U/L — SIGNIFICANT CHANGE UP (ref 0–41)
ANION GAP SERPL CALC-SCNC: 11 MMOL/L — SIGNIFICANT CHANGE UP (ref 7–14)
ANION GAP SERPL CALC-SCNC: 12 MMOL/L — SIGNIFICANT CHANGE UP (ref 7–14)
APPEARANCE UR: CLEAR — SIGNIFICANT CHANGE UP
AST SERPL-CCNC: 23 U/L — SIGNIFICANT CHANGE UP (ref 0–41)
AST SERPL-CCNC: 28 U/L — SIGNIFICANT CHANGE UP (ref 0–41)
BASOPHILS # BLD AUTO: 0.03 K/UL — SIGNIFICANT CHANGE UP (ref 0–0.2)
BASOPHILS # BLD AUTO: 0.03 K/UL — SIGNIFICANT CHANGE UP (ref 0–0.2)
BASOPHILS NFR BLD AUTO: 0.5 % — SIGNIFICANT CHANGE UP (ref 0–1)
BASOPHILS NFR BLD AUTO: 0.6 % — SIGNIFICANT CHANGE UP (ref 0–1)
BILIRUB SERPL-MCNC: 0.9 MG/DL — SIGNIFICANT CHANGE UP (ref 0.2–1.2)
BILIRUB SERPL-MCNC: 0.9 MG/DL — SIGNIFICANT CHANGE UP (ref 0.2–1.2)
BILIRUB UR-MCNC: NEGATIVE — SIGNIFICANT CHANGE UP
BUN SERPL-MCNC: 10 MG/DL — SIGNIFICANT CHANGE UP (ref 10–20)
BUN SERPL-MCNC: 11 MG/DL — SIGNIFICANT CHANGE UP (ref 10–20)
CALCIUM SERPL-MCNC: 8.3 MG/DL — LOW (ref 8.5–10.1)
CALCIUM SERPL-MCNC: 8.3 MG/DL — LOW (ref 8.5–10.1)
CHLORIDE SERPL-SCNC: 96 MMOL/L — LOW (ref 98–110)
CHLORIDE SERPL-SCNC: 97 MMOL/L — LOW (ref 98–110)
CO2 SERPL-SCNC: 29 MMOL/L — SIGNIFICANT CHANGE UP (ref 17–32)
CO2 SERPL-SCNC: 30 MMOL/L — SIGNIFICANT CHANGE UP (ref 17–32)
COLOR SPEC: YELLOW — SIGNIFICANT CHANGE UP
CREAT SERPL-MCNC: 0.5 MG/DL — LOW (ref 0.7–1.5)
CREAT SERPL-MCNC: 0.5 MG/DL — LOW (ref 0.7–1.5)
CULTURE RESULTS: NO GROWTH — SIGNIFICANT CHANGE UP
DIFF PNL FLD: NEGATIVE — SIGNIFICANT CHANGE UP
EOSINOPHIL # BLD AUTO: 0.12 K/UL — SIGNIFICANT CHANGE UP (ref 0–0.7)
EOSINOPHIL # BLD AUTO: 0.15 K/UL — SIGNIFICANT CHANGE UP (ref 0–0.7)
EOSINOPHIL NFR BLD AUTO: 2.4 % — SIGNIFICANT CHANGE UP (ref 0–8)
EOSINOPHIL NFR BLD AUTO: 2.6 % — SIGNIFICANT CHANGE UP (ref 0–8)
GLUCOSE SERPL-MCNC: 116 MG/DL — HIGH (ref 70–99)
GLUCOSE SERPL-MCNC: 99 MG/DL — SIGNIFICANT CHANGE UP (ref 70–99)
GLUCOSE UR QL: NEGATIVE MG/DL — SIGNIFICANT CHANGE UP
HCT VFR BLD CALC: 26 % — LOW (ref 37–47)
HCT VFR BLD CALC: 26 % — LOW (ref 37–47)
HGB BLD-MCNC: 8.8 G/DL — LOW (ref 12–16)
HGB BLD-MCNC: 8.8 G/DL — LOW (ref 12–16)
IMM GRANULOCYTES NFR BLD AUTO: 0.4 % — HIGH (ref 0.1–0.3)
IMM GRANULOCYTES NFR BLD AUTO: 0.5 % — HIGH (ref 0.1–0.3)
KETONES UR-MCNC: NEGATIVE — SIGNIFICANT CHANGE UP
LEUKOCYTE ESTERASE UR-ACNC: NEGATIVE — SIGNIFICANT CHANGE UP
LYMPHOCYTES # BLD AUTO: 0.98 K/UL — LOW (ref 1.2–3.4)
LYMPHOCYTES # BLD AUTO: 1.57 K/UL — SIGNIFICANT CHANGE UP (ref 1.2–3.4)
LYMPHOCYTES # BLD AUTO: 19.9 % — LOW (ref 20.5–51.1)
LYMPHOCYTES # BLD AUTO: 27.2 % — SIGNIFICANT CHANGE UP (ref 20.5–51.1)
MAGNESIUM SERPL-MCNC: 2 MG/DL — SIGNIFICANT CHANGE UP (ref 1.8–2.4)
MCHC RBC-ENTMCNC: 30.2 PG — SIGNIFICANT CHANGE UP (ref 27–31)
MCHC RBC-ENTMCNC: 30.2 PG — SIGNIFICANT CHANGE UP (ref 27–31)
MCHC RBC-ENTMCNC: 33.8 G/DL — SIGNIFICANT CHANGE UP (ref 32–37)
MCHC RBC-ENTMCNC: 33.8 G/DL — SIGNIFICANT CHANGE UP (ref 32–37)
MCV RBC AUTO: 89.3 FL — SIGNIFICANT CHANGE UP (ref 81–99)
MCV RBC AUTO: 89.3 FL — SIGNIFICANT CHANGE UP (ref 81–99)
MONOCYTES # BLD AUTO: 0.5 K/UL — SIGNIFICANT CHANGE UP (ref 0.1–0.6)
MONOCYTES # BLD AUTO: 0.55 K/UL — SIGNIFICANT CHANGE UP (ref 0.1–0.6)
MONOCYTES NFR BLD AUTO: 10.2 % — HIGH (ref 1.7–9.3)
MONOCYTES NFR BLD AUTO: 9.5 % — HIGH (ref 1.7–9.3)
NEUTROPHILS # BLD AUTO: 3.27 K/UL — SIGNIFICANT CHANGE UP (ref 1.4–6.5)
NEUTROPHILS # BLD AUTO: 3.44 K/UL — SIGNIFICANT CHANGE UP (ref 1.4–6.5)
NEUTROPHILS NFR BLD AUTO: 59.7 % — SIGNIFICANT CHANGE UP (ref 42.2–75.2)
NEUTROPHILS NFR BLD AUTO: 66.5 % — SIGNIFICANT CHANGE UP (ref 42.2–75.2)
NITRITE UR-MCNC: NEGATIVE — SIGNIFICANT CHANGE UP
PH UR: 6.5 — SIGNIFICANT CHANGE UP (ref 5–8)
PLATELET # BLD AUTO: 201 K/UL — SIGNIFICANT CHANGE UP (ref 130–400)
PLATELET # BLD AUTO: 230 K/UL — SIGNIFICANT CHANGE UP (ref 130–400)
POTASSIUM SERPL-MCNC: 3.8 MMOL/L — SIGNIFICANT CHANGE UP (ref 3.5–5)
POTASSIUM SERPL-MCNC: 3.9 MMOL/L — SIGNIFICANT CHANGE UP (ref 3.5–5)
POTASSIUM SERPL-SCNC: 3.8 MMOL/L — SIGNIFICANT CHANGE UP (ref 3.5–5)
POTASSIUM SERPL-SCNC: 3.9 MMOL/L — SIGNIFICANT CHANGE UP (ref 3.5–5)
PROT SERPL-MCNC: 5.6 G/DL — LOW (ref 6–8)
PROT SERPL-MCNC: 5.9 G/DL — LOW (ref 6–8)
PROT UR-MCNC: NEGATIVE MG/DL — SIGNIFICANT CHANGE UP
RBC # BLD: 2.91 M/UL — LOW (ref 4.2–5.4)
RBC # BLD: 2.91 M/UL — LOW (ref 4.2–5.4)
RBC # FLD: 13.6 % — SIGNIFICANT CHANGE UP (ref 11.5–14.5)
RBC # FLD: 13.8 % — SIGNIFICANT CHANGE UP (ref 11.5–14.5)
SODIUM SERPL-SCNC: 137 MMOL/L — SIGNIFICANT CHANGE UP (ref 135–146)
SODIUM SERPL-SCNC: 138 MMOL/L — SIGNIFICANT CHANGE UP (ref 135–146)
SP GR SPEC: <=1.005 — SIGNIFICANT CHANGE UP (ref 1.01–1.03)
SPECIMEN SOURCE: SIGNIFICANT CHANGE UP
UROBILINOGEN FLD QL: 1 MG/DL (ref 0.2–0.2)
WBC # BLD: 4.92 K/UL — SIGNIFICANT CHANGE UP (ref 4.8–10.8)
WBC # BLD: 5.77 K/UL — SIGNIFICANT CHANGE UP (ref 4.8–10.8)
WBC # FLD AUTO: 4.92 K/UL — SIGNIFICANT CHANGE UP (ref 4.8–10.8)
WBC # FLD AUTO: 5.77 K/UL — SIGNIFICANT CHANGE UP (ref 4.8–10.8)

## 2018-06-12 RX ORDER — SIMETHICONE 80 MG/1
80 TABLET, CHEWABLE ORAL THREE TIMES A DAY
Qty: 0 | Refills: 0 | Status: DISCONTINUED | OUTPATIENT
Start: 2018-06-12 | End: 2018-06-21

## 2018-06-12 RX ORDER — FERROUS SULFATE 325(65) MG
325 TABLET ORAL DAILY
Qty: 0 | Refills: 0 | Status: DISCONTINUED | OUTPATIENT
Start: 2018-06-12 | End: 2018-06-21

## 2018-06-12 RX ORDER — ACETAMINOPHEN 500 MG
650 TABLET ORAL EVERY 4 HOURS
Qty: 0 | Refills: 0 | Status: DISCONTINUED | OUTPATIENT
Start: 2018-06-12 | End: 2018-06-21

## 2018-06-12 RX ORDER — ENOXAPARIN SODIUM 100 MG/ML
40 INJECTION SUBCUTANEOUS DAILY
Qty: 0 | Refills: 0 | Status: DISCONTINUED | OUTPATIENT
Start: 2018-06-12 | End: 2018-06-19

## 2018-06-12 RX ORDER — OXYCODONE AND ACETAMINOPHEN 5; 325 MG/1; MG/1
1 TABLET ORAL EVERY 6 HOURS
Qty: 0 | Refills: 0 | Status: DISCONTINUED | OUTPATIENT
Start: 2018-06-12 | End: 2018-06-12

## 2018-06-12 RX ORDER — ENOXAPARIN SODIUM 100 MG/ML
40 INJECTION SUBCUTANEOUS
Qty: 0 | Refills: 0 | COMMUNITY
Start: 2018-06-12

## 2018-06-12 RX ORDER — FOLIC ACID 0.8 MG
1 TABLET ORAL DAILY
Qty: 0 | Refills: 0 | Status: DISCONTINUED | OUTPATIENT
Start: 2018-06-12 | End: 2018-06-21

## 2018-06-12 RX ORDER — OXYCODONE AND ACETAMINOPHEN 5; 325 MG/1; MG/1
1 TABLET ORAL EVERY 6 HOURS
Qty: 0 | Refills: 0 | Status: DISCONTINUED | OUTPATIENT
Start: 2018-06-12 | End: 2018-06-18

## 2018-06-12 RX ORDER — SENNA PLUS 8.6 MG/1
2 TABLET ORAL AT BEDTIME
Qty: 0 | Refills: 0 | Status: DISCONTINUED | OUTPATIENT
Start: 2018-06-12 | End: 2018-06-21

## 2018-06-12 RX ADMIN — SENNA PLUS 2 TABLET(S): 8.6 TABLET ORAL at 21:27

## 2018-06-12 RX ADMIN — ENOXAPARIN SODIUM 40 MILLIGRAM(S): 100 INJECTION SUBCUTANEOUS at 12:18

## 2018-06-12 NOTE — PROGRESS NOTE ADULT - ATTENDING COMMENTS
Patient seen and examined independently. I agree with the resident's note, physical exam, and plan except as below.    #sp mechanical fall   #Right IT hip fracture - sp IMN  -dvt proph, pain control  -pt rehab - good 4A candidate  -discussed with family at the bed side and happy that patient is going to acute rehab today.   D.C to acute rehab today
Pt seen and examined.  Agree with resident exam and plan.    c/d/i, NVI, no calf tenderness      WBAT, OOB, PT, IS, DVT proph, pain control, check H&H
Patient seen and examined independently. I agree with the resident's note, physical exam, and plan except as below.    #sp mechanical fall   #Right IT hip fracture - sp IMN  -dvt proph, pain control  -pt rehab - good 4A candidate  -discussed with family, pt, and hs

## 2018-06-12 NOTE — DISCHARGE NOTE ADULT - HOSPITAL COURSE
78 year old female with no significant past medical history presented to ED with right hip pain and inability to bear weight after a fall yesterday. She tripped and fell on the sidewalk and was in severe pain and unable to walk. Pain has persisted and localized to her right hip. She denies any numbness, paresthesias, fever and chills    #Right Inter-trochanteric Hip fracture   - Closed displaced intertrochanteric fracture of right femur after a fall  - Pain control  - Weight bearing as tolerated  - PT rehab following  - Fixation of closed intertrochanteric fracture with intramedullary angelita  - Physiatry has recommended 4a for rehab and she is getting discharged there for further management.    She comes from home where she is fully functional as per the grand daughter and is on DVT prophylaxis

## 2018-06-12 NOTE — DISCHARGE NOTE ADULT - PLAN OF CARE
resolution Patient would benefit from rehab and getting discharged to 4 a, after the fixation of closed intertrochanteric fracture with intramedullary angelita. She is on percocet as needed for the pain

## 2018-06-12 NOTE — DISCHARGE NOTE ADULT - CARE PROVIDER_API CALL
[facility],   [address] [place] [address]  Phone: [phone]  Fax: (   )    -

## 2018-06-12 NOTE — DISCHARGE NOTE ADULT - CARE PLAN
Principal Discharge DX:	Hip fracture  Goal:	resolution  Assessment and plan of treatment:	Patient would benefit from rehab and getting discharged to 4 a, after the fixation of closed intertrochanteric fracture with intramedullary angelita. She is on percocet as needed for the pain

## 2018-06-12 NOTE — PROGRESS NOTE ADULT - SUBJECTIVE AND OBJECTIVE BOX
SUBJECTIVE:    Patient is a 78y old Female who presents with a chief complaint of right hip pain (2018 01:46)    Currently admitted to medicine with the primary diagnosis of Hip fracture     Today is hospital day 3d. This morning she is resting comfortably in the chair and reports no new issues or overnight events.     PAST MEDICAL & SURGICAL HISTORY  No pertinent past medical history  No significant past surgical history    SOCIAL HISTORY:  Negative for smoking/alcohol/drug use.     ALLERGIES:  No Known Allergies    MEDICATIONS:  STANDING MEDICATIONS  enoxaparin Injectable 40 milliGRAM(s) SubCutaneous daily    PRN MEDICATIONS  acetaminophen    Suspension 650 milliGRAM(s) Oral every 6 hours PRN  morphine  - Injectable 2 milliGRAM(s) IV Push every 6 hours PRN    VITALS:   T(F): 98.4  HR: 68  BP: 149/63  RR: 18  SpO2: --    LABS:                        9.7    6.35  )-----------( 183      ( 10 Abner 2018 07:38 )             26.5     06-10    138  |  97<L>  |  9<L>  ----------------------------<  134<H>  3.9   |  26  |  0.6<L>    Ca    7.9<L>      10 Abner 2018 07:38        Urinalysis Basic - ( 10 Abner 2018 22:39 )    Color: Yellow / Appearance: Cloudy / S.015 / pH: x  Gluc: x / Ketone: Negative  / Bili: Negative / Urobili: 4.0 mg/dL   Blood: x / Protein: 30 mg/dL / Nitrite: Negative   Leuk Esterase: Negative / RBC: x / WBC x   Sq Epi: x / Non Sq Epi: Occasional /HPF / Bacteria: Few /HPF              PHYSICAL EXAM:    GENERAL: NAD  HEAD:  Atraumatic, Normocephalic  NERVOUS SYSTEM:  Alert & Oriented X3, Good concentration; Non-focal- Motor Strength 5/5 B/L upper and lower extremities;  CHEST/LUNG: Clear to ascultation bilaterally; No rales, rhonchi, wheezing,   HEART: Regular rate and rhythm; S1 and S2  ABDOMEN: Soft, Nontender and Nondistended  EXTREMITIES: No edema, Right lateral hip incision site intact   PSYCH: No suicidal ideation and no hallucinations, AAO x 3
s/p right hip orif pod 3    seen and examined  comfortable, pain controlled  denies cp sob      PE  RLE  comps soft  dressing cdi  ehl fhl tib ant intact  silt, wwp    A/P: POD3  R IMN for IT fx  WBAT  pain control  dvt ppx  PT/OT/rehab  dispo planning   medical management
HOSPITALIST ATTENDING NOTE    JAZMIN, ANGY  78y Female  [id number]    INTERVAL HPI/OVERNIGHT EVENTS: pod 1 - pain controlled     T(C): 37.5 (06-10-18 @ 16:00), Max: 38.3 (06-10-18 @ 07:28)  HR: 70 (06-10-18 @ 16:00) (70 - 79)  BP: 119/56 (06-10-18 @ 16:00) (119/56 - 140/79)  RR: 16 (06-10-18 @ 16:00) (16 - 19)  SpO2: --  Wt(kg): --    06-09-18 @ 07:01  -  06-10-18 @ 07:00  --------------------------------------------------------  IN: 245 mL / OUT: 605 mL / NET: -360 mL    06-10-18 @ 07:01  -  06-10-18 @ 22:47  --------------------------------------------------------  IN: 300 mL / OUT: 600 mL / NET: -300 mL        PHYSICAL EXAM:  GENERAL: NAD  HEAD:  Atraumatic, Normocephalic  EYES: EOMI, PERRLA, conjunctiva and sclera clear  ENMT: No tonsillar erythema, exudates, or enlargement  NECK: Supple, No JVD, Normal thyroid  NERVOUS SYSTEM:  Alert & Oriented X3, Good concentration; Non-focal- Motor Strength 5/5 B/L upper and lower extremities;  CHEST/LUNG: Clear to ascultation bilaterally; No rales, rhonchi, wheezing,   HEART: Regular rate and rhythm; No murmurs, rubs, or gallops  ABDOMEN: Soft, Nontender, Nondistended; Bowel sounds present  EXTREMITIES: No clubbing, cyanosis, or edema, RIght lat hip inc site intact   LYMPH: No lymphadenopathy noted  SKIN: No rashes or lesions  PSYCH: No suicidal/homicial ideation/hallucinations    Consultant(s) Notes Reviewed:  [x ] YES  [ ] NO  Care Discussed with Consultants/Other Providers/ Housestaff [ x] YES  [ ] NO    LABS:                        9.7    6.35  )-----------( 183      ( 10 Abner 2018 07:38 )             26.5     06-10    138  |  97<L>  |  9<L>  ----------------------------<  134<H>  3.9   |  26  |  0.6<L>    Ca    7.9<L>      10 Abner 2018 07:38            RADIOLOGY & ADDITIONAL TESTS:    Imaging or report Personally Reviewed:  [ ] YES  [ ] NO    Case discussed with resident    Care discussed with pt/family      HEALTH ISSUES - PROBLEM Dx:
POC   seen and examined  comfortable, pain controlled  denies cp sob    Vital Signs Last 24 Hrs  T(C): 36.9 (10 Abner 2018 00:15), Max: 37.9 (09 Jun 2018 20:12)  T(F): 98.5 (10 Abner 2018 00:15), Max: 100.3 (09 Jun 2018 20:12)  HR: 79 (10 Abner 2018 00:15) (73 - 86)  BP: 140/79 (10 Abner 2018 00:15) (140/79 - 181/77)  BP(mean): --  RR: 18 (10 Abner 2018 00:15) (18 - 27)  SpO2: 96% (09 Jun 2018 15:02) (94% - 98%)                          11.1   6.82  )-----------( 175      ( 09 Jun 2018 14:02 )             33.3       PE  RLE  comps soft  dressing cdi  ehl fhl tib ant intact  silt, wwp    A/P: POD1 R IMN for IT fx  WBAT  pain control  dvt ppx  abx postop   PT/OT/rehab
SUBJECTIVE:    Patient is a 78y old Female who presents with a chief complaint of right hip pain (2018 01:46)    Currently admitted to medicine with the primary diagnosis of Hip fracture     Today is hospital day 3d. This morning she is resting comfortably in bed and reports no new issues or overnight events.     PAST MEDICAL & SURGICAL HISTORY  No pertinent past medical history  No significant past surgical history    SOCIAL HISTORY:  Negative for smoking/alcohol/drug use.     ALLERGIES:  No Known Allergies    MEDICATIONS:  STANDING MEDICATIONS  enoxaparin Injectable 40 milliGRAM(s) SubCutaneous daily    PRN MEDICATIONS  acetaminophen    Suspension 650 milliGRAM(s) Oral every 6 hours PRN  morphine  - Injectable 2 milliGRAM(s) IV Push every 6 hours PRN    VITALS:   T(F): 98.4  HR: 68  BP: 149/63  RR: 18  SpO2: --    LABS:                        9.7    6.35  )-----------( 183      ( 10 Abner 2018 07:38 )             26.5     06-10    138  |  97<L>  |  9<L>  ----------------------------<  134<H>  3.9   |  26  |  0.6<L>    Ca    7.9<L>      10 Abner 2018 07:38        Urinalysis Basic - ( 10 Abner 2018 22:39 )    Color: Yellow / Appearance: Cloudy / S.015 / pH: x  Gluc: x / Ketone: Negative  / Bili: Negative / Urobili: 4.0 mg/dL   Blood: x / Protein: 30 mg/dL / Nitrite: Negative   Leuk Esterase: Negative / RBC: x / WBC x   Sq Epi: x / Non Sq Epi: Occasional /HPF / Bacteria: Few /HPF              PHYSICAL EXAM:    GENERAL: NAD  HEAD:  Atraumatic, Normocephalic  NERVOUS SYSTEM:  Alert & Oriented X3, Good concentration; Non-focal- Motor Strength 5/5 B/L upper and lower extremities;  CHEST/LUNG: Clear to ascultation bilaterally; No rales, rhonchi, wheezing,   HEART: Regular rate and rhythm; S1 and S2  ABDOMEN: Soft, Nontender and Nondistended  EXTREMITIES: No edema, Right lateral hip incision site intact   PSYCH: No suicidal ideation and no hallucinations, AAO x 3
seen and examined  comfortable, pain controlled  denies cp sob    Vital Signs Last 24 Hrs  T(C): 38.1 (11 Jun 2018 00:00), Max: 38.1 (11 Jun 2018 00:00)  T(F): 100.5 (11 Jun 2018 00:00), Max: 100.5 (11 Jun 2018 00:00)  HR: 75 (11 Jun 2018 00:00) (70 - 75)  BP: 140/61 (11 Jun 2018 00:00) (119/56 - 140/61)  BP(mean): --  RR: 18 (11 Jun 2018 00:00) (16 - 18)  SpO2: --                          9.7    6.35  )-----------( 183      ( 10 Abner 2018 07:38 )             26.5       PE  RLE  comps soft  dressing cdi  ehl fhl tib ant intact  silt, wwp    A/P: POD2 R IMN for IT fx  WBAT  pain control  dvt ppx  PT/OT/rehab

## 2018-06-12 NOTE — DISCHARGE NOTE ADULT - PATIENT PORTAL LINK FT
You can access the [facility] Patient Portal, offered by [facility], by registering with the following website: [email or web address]

## 2018-06-12 NOTE — DISCHARGE NOTE ADULT - PROVIDER TOKENS
FREE:[LAST:[[facility]PHONE:[phone]],FAX:[(   )    -],ADDRESS:[[address] [place] [address]]]

## 2018-06-12 NOTE — PROGRESS NOTE ADULT - ASSESSMENT
78 year old female with no significant past medical history presented to ED with right hip pain and inability to bear weight after a fall yesterday. She tripped and fell on the sidewalk and was in severe pain and unable to walk. Pain has persisted and localized to her right hip. She denies any numbness, paresthesias, fever and chills    #Right Inter-trochanteric Hip fracture   - Closed displaced intertrochanteric fracture of right femur after a fall  - Pain control  - Weight bearing as tolerated  - PT rehab following  - Fixation of closed intertrochanteric fracture with intramedullary angelita    - Physiatry has recommended 4a for rehab, she comes from home where she is fully functional as per the grand daughter and is on DVT prophylaxis
78 year old female with no significant past medical history presented to ED with right hip pain and inability to bear weight after a fall yesterday. She tripped and fell on the sidewalk and was in severe pain and unable to walk. Pain has persisted and localized to her right hip. She denies any numbness, paresthesias, fever and chills    #Right Inter-trochanteric Hip fracture   - Closed displaced intertrochanteric fracture of right femur after a fall  - Pain control  - Weight bearing as tolerated  - PT rehab following  - Fixation of closed intertrochanteric fracture with intramedullary angelita    - Physiatry has recommended 4a for rehab, she comes from home where she is fully functional as per the grand daughter and is on DVT prophylaxis
78y F with no significant pmh presenting to ED with right hip pain and inability to bear weight s/p fall yesterday. She tripped and fell on the sidewalk and was in severe pain and unable to walk. Pain has persisted and localized to her right hip. no exacerbating or alleviating factors. No other injuries, no LOC. No dizziness/nausea before fall. Denies any numbness, paresthesias, fever, chills, SOB.    #Right IT hip fracture - mechanical fall  - pod 1  -pain control, dvt proph  -wbat  pt/rehab  mild post op anemia - stable    pt is visiting from [place]  no insurance - may be a rehab/dispo issue if she is unable to go home

## 2018-06-12 NOTE — DISCHARGE NOTE ADULT - MEDICATION SUMMARY - MEDICATIONS TO TAKE
I will START or STAY ON the medications listed below when I get home from the hospital:    oxyCODONE-acetaminophen 5 mg-325 mg oral tablet  -- 1 tab(s) by mouth every 6 hours, As needed, Severe Pain (7 - 10)  -- Indication: For Hip fracture    enoxaparin  -- 40 milligram(s) subcutaneous once a day  -- Indication: For dvt prophylaxis

## 2018-06-13 LAB
24R-OH-CALCIDIOL SERPL-MCNC: 27 NG/ML — LOW (ref 30–80)
MAGNESIUM SERPL-MCNC: 2.2 MG/DL — SIGNIFICANT CHANGE UP (ref 1.8–2.4)
PHOSPHATE SERPL-MCNC: 4.3 MG/DL — SIGNIFICANT CHANGE UP (ref 2.1–4.9)

## 2018-06-13 RX ADMIN — Medication 325 MILLIGRAM(S): at 12:42

## 2018-06-13 RX ADMIN — Medication 1 TABLET(S): at 12:42

## 2018-06-13 RX ADMIN — OXYCODONE AND ACETAMINOPHEN 1 TABLET(S): 5; 325 TABLET ORAL at 08:04

## 2018-06-13 RX ADMIN — Medication 1 MILLIGRAM(S): at 12:42

## 2018-06-13 RX ADMIN — SENNA PLUS 2 TABLET(S): 8.6 TABLET ORAL at 21:29

## 2018-06-13 RX ADMIN — OXYCODONE AND ACETAMINOPHEN 1 TABLET(S): 5; 325 TABLET ORAL at 08:30

## 2018-06-14 LAB — VIT B12 SERPL-MCNC: 248 PG/ML — SIGNIFICANT CHANGE UP (ref 232–1245)

## 2018-06-14 RX ADMIN — Medication 1 MILLIGRAM(S): at 12:18

## 2018-06-14 RX ADMIN — ENOXAPARIN SODIUM 40 MILLIGRAM(S): 100 INJECTION SUBCUTANEOUS at 12:18

## 2018-06-14 RX ADMIN — Medication 325 MILLIGRAM(S): at 12:18

## 2018-06-14 RX ADMIN — Medication 1 TABLET(S): at 12:18

## 2018-06-15 ENCOUNTER — TRANSCRIPTION ENCOUNTER (OUTPATIENT)
Age: 78
End: 2018-06-15
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
## 2018-06-15 DIAGNOSIS — D62 ACUTE POSTHEMORRHAGIC ANEMIA: ICD-10-CM

## 2018-06-15 DIAGNOSIS — S72.141A DISPLACED INTERTROCHANTERIC FRACTURE OF RIGHT FEMUR, INITIAL ENCOUNTER FOR CLOSED FRACTURE: ICD-10-CM

## 2018-06-15 DIAGNOSIS — R26.2 DIFFICULTY IN WALKING, NOT ELSEWHERE CLASSIFIED: ICD-10-CM
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
## 2018-06-15 RX ADMIN — Medication 1 TABLET(S): at 12:50

## 2018-06-15 RX ADMIN — Medication 325 MILLIGRAM(S): at 12:50

## 2018-06-15 RX ADMIN — Medication 1 MILLIGRAM(S): at 12:50

## 2018-06-15 RX ADMIN — ENOXAPARIN SODIUM 40 MILLIGRAM(S): 100 INJECTION SUBCUTANEOUS at 12:50

## 2018-06-15 NOTE — DISCHARGE NOTE ADULT - MEDICATION SUMMARY - MEDICATIONS TO STOP TAKING
I will STOP taking the medications listed below when I get home from the hospital:    enoxaparin  -- 40 milligram(s) subcutaneous once a day    oxyCODONE-acetaminophen 5 mg-325 mg oral tablet  -- 1 tab(s) by mouth every 6 hours, As needed, Severe Pain (7 - 10)

## 2018-06-15 NOTE — DISCHARGE NOTE ADULT - CARE PROVIDERS DIRECT ADDRESSES
,DirectAddress_Unknown ,DirectAddress_Unknown,[email or web address]

## 2018-06-15 NOTE — DISCHARGE NOTE ADULT - CARE PLAN
Goal:	R hip ORIF  Assessment and plan of treatment:	- Procedure done on 6/9  - Please follow up with Dr. Gonzalez in 1-2 weeks.  - Noted to be weight bearing as tolerated by Orthopedics service  - Please continue to take Percocet as needed for pain  - Please continue therapy as an outpatient. Goal:	R hip ORIF  Assessment and plan of treatment:	- Procedure done on 6/9  - Please follow up with Dr. Gonzalez in 1-2 weeks.  - Noted to be weight bearing as tolerated by Orthopedics service  - Please continue therapy as an outpatient. Goal:	R hip ORIF  Assessment and plan of treatment:	- Procedure done on 6/9  - Please follow up with Dr. Gonzalez in 1-2 weeks.  - Noted to be weight bearing as tolerated by Orthopedics service  - Please continue therapy as an outpatient.  Goal:	Blood Clot  Assessment and plan of treatment:	- Present in bilateral peroneal veins (distal DVT)  - Started on Treatment dose Lovenox (50mg twice daily)  - Will continue on Eliquis 5mg twice daily for 7 days and then 2.5mg twice daily to complete a three month course.   - Vascular Surgery Consulted - agreed with three month anticoagulation treatment plan. Goal:	R hip ORIF  Assessment and plan of treatment:	- Procedure done on 6/9  - Please follow up with Dr. Gonzalez in 1-2 weeks.  - Noted to be weight bearing as tolerated by Orthopedics service  - Please continue therapy as an outpatient.  Goal:	Blood Clot  Assessment and plan of treatment:	- Present in bilateral peroneal veins (distal DVT)  - Will continue on Eliquis 5mg twice daily for 14 days and then 2.5mg twice daily to complete a three month course.   - Vascular Surgery Consulted - agreed with three month anticoagulation treatment plan.  - f/u Vascular Surgery, Dr. Larios, in 4-6 weeks.

## 2018-06-15 NOTE — DISCHARGE NOTE ADULT - PLAN OF CARE
R hip ORIF - Procedure done on 6/9  - Please follow up with Dr. Gonzalez in 1-2 weeks.  - Noted to be weight bearing as tolerated by Orthopedics service  - Please continue to take Percocet as needed for pain  - Please continue therapy as an outpatient. - Procedure done on 6/9  - Please follow up with Dr. Gonzalez in 1-2 weeks.  - Noted to be weight bearing as tolerated by Orthopedics service  - Please continue therapy as an outpatient. Blood Clot - Present in bilateral peroneal veins (distal DVT)  - Started on Treatment dose Lovenox (50mg twice daily)  - Will continue on Eliquis 5mg twice daily for 7 days and then 2.5mg twice daily to complete a three month course.   - Vascular Surgery Consulted - agreed with three month anticoagulation treatment plan. - Present in bilateral peroneal veins (distal DVT)  - Will continue on Eliquis 5mg twice daily for 14 days and then 2.5mg twice daily to complete a three month course.   - Vascular Surgery Consulted - agreed with three month anticoagulation treatment plan.  - f/u Vascular Surgery, Dr. Larios, in 4-6 weeks.

## 2018-06-15 NOTE — DISCHARGE NOTE ADULT - CARE PROVIDER_API CALL
Marissa Gonzalez (MD), Orthopaedic Surgery  [address] [place] [address]  Phone: [phone]  Fax: [phone] Marissa Gonzalez), Orthopaedic Surgery  [address] [place] [address]  Phone: [phone]  Fax: [phone]    Oj Larios), Surgery; Vascular Surgery  [address] [place] [address] [address]  [place] [address]  Phone: [phone]  Fax: [phone]

## 2018-06-15 NOTE — DISCHARGE NOTE ADULT - MEDICATION SUMMARY - MEDICATIONS TO TAKE
I will START or STAY ON the medications listed below when I get home from the hospital:    apixaban 5 mg oral tablet  -- 2 tab(s) by mouth every 12 hours  -- Indication: For for blood clots    calcium-vitamin D 500 mg-200 intl units oral tablet  -- 1 tab(s) by mouth once a day  -- Indication: For for malnutrition and bone density

## 2018-06-15 NOTE — DISCHARGE NOTE ADULT - HOSPITAL COURSE
HPI:   78 y.o. female with no significant PMHx presented with R hip pain after a mechanical fall. X-rays were done, demonstrating inter-trochanteric hip fracture. Pt underwent open reduction with internal fixation on 6/9 with Dr. Gonzalez. Post-operatively, the patient's hospital stay was complicated by diarrhea. C.difficile was negative. Pt was allowed to be weight bearing as tolerated.     Prior functional status: Ambulatory independent, bed mobility and transfers independent.     Admission Physical Exam:  Vital signs stable. Afebrile  Gen: alert, NAD  Cardio: RRR  Lungs: clear  Abd: soft, NT  Extremities: without edema. PROM wnl.  Neuro: Alert and oriented to time, place, and person.   Cranial nerves: II-XII grossly intact.   Motor Power: RUE/LUE 4+/5, LLE 4+/5, RLE 4+/5 distally, 3/5 proximally, limited secondary to pain   Sensation: intact    Hospital Course: The patient was admitted to the acute inpatient rehab unit presenting with a decline in functional status. The patient participated in three hours of multidisciplinary therapy 5-6 days per week. The patient was continued on all home medications or equivalent alternatives as deemed appropriate. The patient received prophylactic anticoagulation medication and was monitored closely with no complications. During the stay on the inpatient unit, the patient had no acute events and tolerated therapy well. The patient was stable and cleared for discharge by a multidisciplinary team.    Discharge functional status:    Discharge disposition: HPI:   78 y.o. female with no significant PMHx presented with R hip pain after a mechanical fall. X-rays were done, demonstrating inter-trochanteric hip fracture. Pt underwent open reduction with internal fixation on 6/9 with Dr. Gonzalez. Post-operatively, the patient's hospital stay was complicated by diarrhea. C.difficile was negative. Pt was allowed to be weight bearing as tolerated.     Prior functional status: Ambulatory independent, bed mobility and transfers independent.     Admission Physical Exam:  Vital signs stable. Afebrile  Gen: alert, NAD  Cardio: RRR  Lungs: clear  Abd: soft, NT  Extremities: without edema. PROM wnl.  Neuro: Alert and oriented to time, place, and person.   Cranial nerves: II-XII grossly intact.   Motor Power: RUE/LUE 4+/5, LLE 4+/5, RLE 4+/5 distally, 3/5 proximally, limited secondary to pain   Sensation: intact    Hospital Course: The patient was admitted to the acute inpatient rehab unit presenting with a decline in functional status. The patient participated in three hours of multidisciplinary therapy 5-6 days per week. The patient was continued on all home medications or equivalent alternatives as deemed appropriate. The patient received prophylactic anticoagulation medication and was monitored closely with no complications. During the stay on the inpatient unit, the patient had no acute events and tolerated therapy well. The patient was stable and cleared for discharge by a multidisciplinary team.    Discharge functional status:     Discharge disposition: Home with familial assistance.

## 2018-06-15 NOTE — DISCHARGE NOTE ADULT - PATIENT PORTAL LINK FT
You can access the [facility] Patient Portal, offered by [facility], by registering with the following website: [email or web address]

## 2018-06-16 LAB
CULTURE RESULTS: SIGNIFICANT CHANGE UP
SPECIMEN SOURCE: SIGNIFICANT CHANGE UP

## 2018-06-16 RX ORDER — PANTOPRAZOLE SODIUM 20 MG/1
40 TABLET, DELAYED RELEASE ORAL
Qty: 0 | Refills: 0 | Status: DISCONTINUED | OUTPATIENT
Start: 2018-06-16 | End: 2018-06-21

## 2018-06-16 RX ORDER — CHOLECALCIFEROL (VITAMIN D3) 125 MCG
2000 CAPSULE ORAL DAILY
Qty: 0 | Refills: 0 | Status: DISCONTINUED | OUTPATIENT
Start: 2018-06-16 | End: 2018-06-21

## 2018-06-16 RX ORDER — PREGABALIN 225 MG/1
1000 CAPSULE ORAL DAILY
Qty: 0 | Refills: 0 | Status: DISCONTINUED | OUTPATIENT
Start: 2018-06-16 | End: 2018-06-21

## 2018-06-16 RX ADMIN — Medication 2000 UNIT(S): at 19:57

## 2018-06-16 RX ADMIN — ENOXAPARIN SODIUM 40 MILLIGRAM(S): 100 INJECTION SUBCUTANEOUS at 11:01

## 2018-06-16 RX ADMIN — PREGABALIN 1000 MICROGRAM(S): 225 CAPSULE ORAL at 19:57

## 2018-06-17 RX ADMIN — Medication 325 MILLIGRAM(S): at 13:56

## 2018-06-17 RX ADMIN — ENOXAPARIN SODIUM 40 MILLIGRAM(S): 100 INJECTION SUBCUTANEOUS at 13:59

## 2018-06-17 RX ADMIN — PANTOPRAZOLE SODIUM 40 MILLIGRAM(S): 20 TABLET, DELAYED RELEASE ORAL at 06:30

## 2018-06-17 RX ADMIN — Medication 1 MILLIGRAM(S): at 13:56

## 2018-06-17 RX ADMIN — PREGABALIN 1000 MICROGRAM(S): 225 CAPSULE ORAL at 15:38

## 2018-06-17 RX ADMIN — Medication 1 TABLET(S): at 13:56

## 2018-06-17 RX ADMIN — Medication 2000 UNIT(S): at 13:57

## 2018-06-18 LAB
ALBUMIN SERPL ELPH-MCNC: 3.3 G/DL — LOW (ref 3.5–5.2)
ALP SERPL-CCNC: 108 U/L — SIGNIFICANT CHANGE UP (ref 30–115)
ALT FLD-CCNC: 14 U/L — SIGNIFICANT CHANGE UP (ref 0–41)
ANION GAP SERPL CALC-SCNC: 11 MMOL/L — SIGNIFICANT CHANGE UP (ref 7–14)
AST SERPL-CCNC: 15 U/L — SIGNIFICANT CHANGE UP (ref 0–41)
BASOPHILS # BLD AUTO: 0.04 K/UL — SIGNIFICANT CHANGE UP (ref 0–0.2)
BASOPHILS NFR BLD AUTO: 0.9 % — SIGNIFICANT CHANGE UP (ref 0–1)
BILIRUB SERPL-MCNC: 0.6 MG/DL — SIGNIFICANT CHANGE UP (ref 0.2–1.2)
BUN SERPL-MCNC: 12 MG/DL — SIGNIFICANT CHANGE UP (ref 10–20)
CALCIUM SERPL-MCNC: 8.4 MG/DL — LOW (ref 8.5–10.1)
CHLORIDE SERPL-SCNC: 101 MMOL/L — SIGNIFICANT CHANGE UP (ref 98–110)
CO2 SERPL-SCNC: 28 MMOL/L — SIGNIFICANT CHANGE UP (ref 17–32)
CREAT SERPL-MCNC: 0.5 MG/DL — LOW (ref 0.7–1.5)
EOSINOPHIL # BLD AUTO: 0.17 K/UL — SIGNIFICANT CHANGE UP (ref 0–0.7)
EOSINOPHIL NFR BLD AUTO: 3.9 % — SIGNIFICANT CHANGE UP (ref 0–8)
GLUCOSE SERPL-MCNC: 94 MG/DL — SIGNIFICANT CHANGE UP (ref 70–99)
HCT VFR BLD CALC: 27.6 % — LOW (ref 37–47)
HGB BLD-MCNC: 9.2 G/DL — LOW (ref 12–16)
IMM GRANULOCYTES NFR BLD AUTO: 0.5 % — HIGH (ref 0.1–0.3)
LYMPHOCYTES # BLD AUTO: 1.25 K/UL — SIGNIFICANT CHANGE UP (ref 1.2–3.4)
LYMPHOCYTES # BLD AUTO: 28.7 % — SIGNIFICANT CHANGE UP (ref 20.5–51.1)
MAGNESIUM SERPL-MCNC: 2.2 MG/DL — SIGNIFICANT CHANGE UP (ref 1.8–2.4)
MCHC RBC-ENTMCNC: 30.7 PG — SIGNIFICANT CHANGE UP (ref 27–31)
MCHC RBC-ENTMCNC: 33.3 G/DL — SIGNIFICANT CHANGE UP (ref 32–37)
MCV RBC AUTO: 92 FL — SIGNIFICANT CHANGE UP (ref 81–99)
MONOCYTES # BLD AUTO: 0.42 K/UL — SIGNIFICANT CHANGE UP (ref 0.1–0.6)
MONOCYTES NFR BLD AUTO: 9.7 % — HIGH (ref 1.7–9.3)
NEUTROPHILS # BLD AUTO: 2.45 K/UL — SIGNIFICANT CHANGE UP (ref 1.4–6.5)
NEUTROPHILS NFR BLD AUTO: 56.3 % — SIGNIFICANT CHANGE UP (ref 42.2–75.2)
NRBC # BLD: 0 /100 WBCS — SIGNIFICANT CHANGE UP (ref 0–0)
PLATELET # BLD AUTO: 334 K/UL — SIGNIFICANT CHANGE UP (ref 130–400)
POTASSIUM SERPL-MCNC: 4.5 MMOL/L — SIGNIFICANT CHANGE UP (ref 3.5–5)
POTASSIUM SERPL-SCNC: 4.5 MMOL/L — SIGNIFICANT CHANGE UP (ref 3.5–5)
PROT SERPL-MCNC: 5.9 G/DL — LOW (ref 6–8)
RBC # BLD: 3 M/UL — LOW (ref 4.2–5.4)
RBC # FLD: 14.6 % — HIGH (ref 11.5–14.5)
SODIUM SERPL-SCNC: 140 MMOL/L — SIGNIFICANT CHANGE UP (ref 135–146)
WBC # BLD: 4.35 K/UL — LOW (ref 4.8–10.8)
WBC # FLD AUTO: 4.35 K/UL — LOW (ref 4.8–10.8)

## 2018-06-18 RX ORDER — OXYCODONE AND ACETAMINOPHEN 5; 325 MG/1; MG/1
1 TABLET ORAL EVERY 6 HOURS
Qty: 0 | Refills: 0 | Status: DISCONTINUED | OUTPATIENT
Start: 2018-06-18 | End: 2018-06-21

## 2018-06-18 RX ADMIN — ENOXAPARIN SODIUM 40 MILLIGRAM(S): 100 INJECTION SUBCUTANEOUS at 11:27

## 2018-06-18 RX ADMIN — Medication 1 MILLIGRAM(S): at 11:27

## 2018-06-18 RX ADMIN — Medication 1 TABLET(S): at 11:27

## 2018-06-18 RX ADMIN — Medication 325 MILLIGRAM(S): at 11:27

## 2018-06-18 RX ADMIN — Medication 2000 UNIT(S): at 11:27

## 2018-06-18 RX ADMIN — PREGABALIN 1000 MICROGRAM(S): 225 CAPSULE ORAL at 11:27

## 2018-06-19 PROCEDURE — 93970 EXTREMITY STUDY: CPT | Mod: 26

## 2018-06-19 RX ORDER — ASPIRIN/CALCIUM CARB/MAGNESIUM 324 MG
1 TABLET ORAL
Qty: 42 | Refills: 0 | OUTPATIENT
Start: 2018-06-19 | End: 2018-07-09

## 2018-06-19 RX ORDER — ENOXAPARIN SODIUM 100 MG/ML
50 INJECTION SUBCUTANEOUS EVERY 12 HOURS
Qty: 0 | Refills: 0 | Status: DISCONTINUED | OUTPATIENT
Start: 2018-06-19 | End: 2018-06-20

## 2018-06-19 RX ORDER — APIXABAN 2.5 MG/1
10 TABLET, FILM COATED ORAL EVERY 12 HOURS
Qty: 0 | Refills: 0 | Status: DISCONTINUED | OUTPATIENT
Start: 2018-06-19 | End: 2018-06-19

## 2018-06-19 RX ADMIN — ENOXAPARIN SODIUM 50 MILLIGRAM(S): 100 INJECTION SUBCUTANEOUS at 20:18

## 2018-06-19 RX ADMIN — PREGABALIN 1000 MICROGRAM(S): 225 CAPSULE ORAL at 12:42

## 2018-06-19 RX ADMIN — Medication 1 MILLIGRAM(S): at 12:41

## 2018-06-19 RX ADMIN — PANTOPRAZOLE SODIUM 40 MILLIGRAM(S): 20 TABLET, DELAYED RELEASE ORAL at 05:57

## 2018-06-19 RX ADMIN — Medication 325 MILLIGRAM(S): at 12:41

## 2018-06-19 RX ADMIN — Medication 2000 UNIT(S): at 12:47

## 2018-06-19 RX ADMIN — Medication 1 TABLET(S): at 12:41

## 2018-06-20 PROCEDURE — 99222 1ST HOSP IP/OBS MODERATE 55: CPT

## 2018-06-20 RX ORDER — APIXABAN 2.5 MG/1
10 TABLET, FILM COATED ORAL EVERY 12 HOURS
Qty: 0 | Refills: 0 | Status: DISCONTINUED | OUTPATIENT
Start: 2018-06-20 | End: 2018-06-21

## 2018-06-20 RX ADMIN — Medication 325 MILLIGRAM(S): at 11:59

## 2018-06-20 RX ADMIN — Medication 2000 UNIT(S): at 11:58

## 2018-06-20 RX ADMIN — APIXABAN 10 MILLIGRAM(S): 2.5 TABLET, FILM COATED ORAL at 18:34

## 2018-06-20 RX ADMIN — PREGABALIN 1000 MICROGRAM(S): 225 CAPSULE ORAL at 11:59

## 2018-06-20 RX ADMIN — Medication 1 TABLET(S): at 11:57

## 2018-06-20 RX ADMIN — ENOXAPARIN SODIUM 50 MILLIGRAM(S): 100 INJECTION SUBCUTANEOUS at 06:49

## 2018-06-20 RX ADMIN — Medication 1 MILLIGRAM(S): at 12:00

## 2018-06-20 RX ADMIN — PANTOPRAZOLE SODIUM 40 MILLIGRAM(S): 20 TABLET, DELAYED RELEASE ORAL at 06:49

## 2018-06-21 RX ORDER — APIXABAN 2.5 MG/1
2 TABLET, FILM COATED ORAL
Qty: 0 | Refills: 0 | COMMUNITY
Start: 2018-06-21

## 2018-06-21 RX ADMIN — APIXABAN 10 MILLIGRAM(S): 2.5 TABLET, FILM COATED ORAL at 06:45

## 2018-06-21 RX ADMIN — PANTOPRAZOLE SODIUM 40 MILLIGRAM(S): 20 TABLET, DELAYED RELEASE ORAL at 08:28

## 2018-06-29 DIAGNOSIS — Z51.89 ENCOUNTER FOR OTHER SPECIFIED AFTERCARE: ICD-10-CM

## 2018-06-29 DIAGNOSIS — M85.80 OTHER SPECIFIED DISORDERS OF BONE DENSITY AND STRUCTURE, UNSPECIFIED SITE: ICD-10-CM

## 2018-06-29 DIAGNOSIS — S72.141D DISPLACED INTERTROCHANTERIC FRACTURE OF RIGHT FEMUR, SUBSEQUENT ENCOUNTER FOR CLOSED FRACTURE WITH ROUTINE HEALING: ICD-10-CM
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
## 2018-06-29 DIAGNOSIS — D64.9 ANEMIA, UNSPECIFIED: ICD-10-CM

## 2018-06-29 DIAGNOSIS — I82.493 ACUTE EMBOLISM AND THROMBOSIS OF OTHER SPECIFIED DEEP VEIN OF LOWER EXTREMITY, BILATERAL: ICD-10-CM

## 2018-06-29 PROBLEM — Z00.00 ENCOUNTER FOR PREVENTIVE HEALTH EXAMINATION: Status: ACTIVE | Noted: 2018-06-29

## 2018-07-16 ENCOUNTER — APPOINTMENT (OUTPATIENT)
Dept: VASCULAR SURGERY | Facility: CLINIC | Age: 78
End: 2018-07-16
Payer: MEDICAID

## 2018-07-16 VITALS
SYSTOLIC BLOOD PRESSURE: 150 MMHG | BODY MASS INDEX: 19.56 KG/M2 | WEIGHT: 97 LBS | HEIGHT: 59 IN | DIASTOLIC BLOOD PRESSURE: 70 MMHG

## 2018-07-16 DIAGNOSIS — Z78.9 OTHER SPECIFIED HEALTH STATUS: ICD-10-CM

## 2018-07-16 PROCEDURE — 99213 OFFICE O/P EST LOW 20 MIN: CPT

## 2018-07-16 RX ORDER — APIXABAN 5 MG/1
TABLET, FILM COATED ORAL
Refills: 0 | Status: ACTIVE | COMMUNITY

## 2018-09-17 ENCOUNTER — APPOINTMENT (OUTPATIENT)
Dept: VASCULAR SURGERY | Facility: CLINIC | Age: 78
End: 2018-09-17
Payer: MEDICAID

## 2018-09-17 ENCOUNTER — OTHER (OUTPATIENT)
Age: 78
End: 2018-09-17

## 2018-09-17 DIAGNOSIS — Z86.718 PERSONAL HISTORY OF OTHER VENOUS THROMBOSIS AND EMBOLISM: ICD-10-CM

## 2018-09-17 PROCEDURE — 93970 EXTREMITY STUDY: CPT

## 2018-09-17 PROCEDURE — 99213 OFFICE O/P EST LOW 20 MIN: CPT

## 2019-10-13 NOTE — CHART NOTE - NSCHARTNOTEFT_GEN_A_CORE
PACU ANESTHESIA ADMISSION NOTE      Procedure: Fixation, fracture, intertrochanteric, with intramedullary angelita: right    Post op diagnosis:  Closed displaced intertrochanteric fracture of right femur, initial encounter      ____  Intubated  TV:______       Rate: ______      FiO2: ______    ___x_  Patent Airway    ____  Full return of protective reflexes    ___x_  Full recovery from anesthesia / back to baseline status    Vitals:  T(F):98.4   HR: 60  BP: 131/62  RR: 18  SpO2: 99%    Mental Status:  __x__ Awake   ___x__ Alert   _____ Drowsy   _____ Sedated    Nausea/Vomiting:  __x__ NO  ______Yes,   See Post - Op Orders          Pain Scale (0-10):  _____    Treatment: ____ None    __x__ See Post - Op/PCA Orders    Post - Operative Fluids:   ____ Oral   ___x_ See Post - Op Orders    Plan: Discharge:   ____Home       __x___Floor     _____Critical Care    _____  Other:_________________    Comments :Patient was transferred to PACU  respirations normal  No anesthesia complications calm

## 2022-03-31 NOTE — ED PROVIDER NOTE - NSCAREINITIATED _GEN_ER
"Patient at [facility] rehab. Weight is still increased at +8 pounds from last session when her Bumex and metolazone was increased. +3 pitting edema. Patient's exercise tolerance is decreased. States she is more short of breath this week than last week. Patient also reports having a burning sensation in her chest when she went walking with her mother last week. This ""burning\"" subsided when she rested. No chest burning during rehab. NSR on monitor. 122/62 resting 97bpm resting. Please advise.    "
Ruel Dumont(Resident)

## 2022-10-25 NOTE — BRIEF OPERATIVE NOTE - PRE-OP
From: Patricia Strickland  To: Linda Becker  Sent: 10/25/2022 5:04 PM EDT  Subject: Medicine    I don't feel the hanna is a good fit. I haven't been able to sleep. An I'm still very agitated during the day.
<<-----Click on this checkbox to enter Pre-Op Dx

## 2022-10-28 NOTE — PATIENT PROFILE ADULT. - PURPOSEFUL PROACTIVE ROUNDING
Quality 110: Preventive Care And Screening: Influenza Immunization: Influenza Immunization Administered during Influenza season
Quality 111:Pneumonia Vaccination Status For Older Adults: Pneumococcal vaccine (PPSV23) administered on or after patient’s 60th birthday and before the end of the measurement period
Detail Level: Detailed
Quality 130: Documentation Of Current Medications In The Medical Record: Current Medications Documented
Patient

## 2025-07-07 NOTE — ED PROVIDER NOTE - NS ED MD DISPO ADMITTING SERVICE
Patient would like to increase the dose, patient is having restless leg syndrom throught the day   MED